# Patient Record
Sex: MALE | Race: WHITE | NOT HISPANIC OR LATINO | Employment: OTHER | URBAN - METROPOLITAN AREA
[De-identification: names, ages, dates, MRNs, and addresses within clinical notes are randomized per-mention and may not be internally consistent; named-entity substitution may affect disease eponyms.]

---

## 2023-03-02 PROBLEM — R53.83 FATIGUE: Status: ACTIVE | Noted: 2023-03-02

## 2023-03-02 PROBLEM — R10.9 ABDOMINAL PAIN: Status: ACTIVE | Noted: 2023-03-02

## 2023-03-02 PROBLEM — R73.9 HYPERGLYCEMIA: Status: ACTIVE | Noted: 2023-03-02

## 2023-03-02 PROBLEM — R35.0 URINARY FREQUENCY: Status: ACTIVE | Noted: 2023-03-02

## 2023-03-02 PROBLEM — R93.3 ABNORMAL CT SCAN, COLON: Status: ACTIVE | Noted: 2023-03-02

## 2023-03-02 PROBLEM — R00.2 PALPITATIONS: Status: ACTIVE | Noted: 2023-03-02

## 2023-03-02 PROBLEM — I87.2 VENOUS INSUFFICIENCY (CHRONIC) (PERIPHERAL): Status: ACTIVE | Noted: 2023-03-02

## 2023-03-02 PROBLEM — I83.90 VARICOSE VEIN OF LEG: Status: ACTIVE | Noted: 2023-03-02

## 2023-03-02 PROBLEM — L30.9 DERMATITIS: Status: ACTIVE | Noted: 2023-03-02

## 2023-03-02 PROBLEM — N28.9 RENAL INSUFFICIENCY: Status: ACTIVE | Noted: 2023-03-02

## 2023-03-02 PROBLEM — E03.9 HYPOTHYROIDISM: Status: ACTIVE | Noted: 2023-03-02

## 2023-03-02 PROBLEM — M54.50 LOW BACK PAIN: Status: ACTIVE | Noted: 2023-03-02

## 2023-03-02 PROBLEM — E78.5 HYPERLIPIDEMIA: Status: ACTIVE | Noted: 2023-03-02

## 2023-03-02 PROBLEM — R19.7 DIARRHEA: Status: ACTIVE | Noted: 2023-03-02

## 2023-03-02 PROBLEM — R05.9 COUGH: Status: ACTIVE | Noted: 2023-03-02

## 2023-03-02 PROBLEM — D64.9 ANEMIA: Status: ACTIVE | Noted: 2023-03-02

## 2023-03-02 PROBLEM — K21.9 ESOPHAGEAL REFLUX: Status: ACTIVE | Noted: 2023-03-02

## 2023-03-02 PROBLEM — E78.01 FAMILIAL HYPERCHOLESTEREMIA: Status: ACTIVE | Noted: 2023-03-02

## 2023-03-02 PROBLEM — L82.1 SEBORRHEIC KERATOSIS: Status: ACTIVE | Noted: 2023-03-02

## 2023-03-02 PROBLEM — E66.3 OVERWEIGHT WITH BODY MASS INDEX (BMI) OF 25 TO 25.9 IN ADULT: Status: ACTIVE | Noted: 2023-03-02

## 2023-03-02 PROBLEM — T16.9XXA FOREIGN BODY IN EAR: Status: ACTIVE | Noted: 2023-03-02

## 2023-03-02 PROBLEM — R42 DIZZINESS: Status: ACTIVE | Noted: 2023-03-02

## 2023-03-02 PROBLEM — E55.9 VITAMIN D DEFICIENCY: Status: ACTIVE | Noted: 2023-03-02

## 2023-03-02 PROBLEM — M54.2 NECK PAIN: Status: ACTIVE | Noted: 2023-03-02

## 2023-03-02 PROBLEM — J34.2 DEVIATED NASAL SEPTUM: Status: ACTIVE | Noted: 2023-03-02

## 2023-03-02 PROBLEM — I10 ESSENTIAL HYPERTENSION: Status: ACTIVE | Noted: 2023-03-02

## 2023-03-02 PROBLEM — R07.9 CHEST PAIN: Status: ACTIVE | Noted: 2023-03-02

## 2023-03-02 PROBLEM — H57.9 EYE DISORDER: Status: ACTIVE | Noted: 2023-03-02

## 2023-03-02 PROBLEM — R97.20 ELEVATED PSA: Status: ACTIVE | Noted: 2023-03-02

## 2023-03-02 PROBLEM — N40.0 BPH (BENIGN PROSTATIC HYPERPLASIA): Status: ACTIVE | Noted: 2023-03-02

## 2023-03-02 PROBLEM — K62.5 RECTAL BLEEDING: Status: ACTIVE | Noted: 2023-03-02

## 2023-03-02 PROBLEM — R10.9 FLANK PAIN: Status: ACTIVE | Noted: 2023-03-02

## 2023-03-02 PROBLEM — R31.0 GROSS HEMATURIA: Status: ACTIVE | Noted: 2023-03-02

## 2023-03-02 RX ORDER — LISINOPRIL 10 MG/1
1 TABLET ORAL DAILY
COMMUNITY
Start: 2020-11-13 | End: 2023-03-09 | Stop reason: SDUPTHER

## 2023-03-02 RX ORDER — SIMVASTATIN 20 MG/1
1 TABLET, FILM COATED ORAL DAILY
COMMUNITY
Start: 2013-10-27 | End: 2023-03-09 | Stop reason: SDUPTHER

## 2023-03-02 RX ORDER — ASPIRIN 81 MG/1
1 TABLET ORAL DAILY
COMMUNITY
Start: 2013-09-25 | End: 2023-11-02 | Stop reason: ALTCHOICE

## 2023-03-02 RX ORDER — LEVOTHYROXINE SODIUM 50 UG/1
1 TABLET ORAL DAILY
COMMUNITY
Start: 2021-05-03 | End: 2023-03-09 | Stop reason: SDUPTHER

## 2023-03-02 RX ORDER — TAMSULOSIN HYDROCHLORIDE 0.4 MG/1
1 CAPSULE ORAL DAILY
COMMUNITY
Start: 2018-04-24 | End: 2023-03-09 | Stop reason: SDUPTHER

## 2023-03-09 ENCOUNTER — OFFICE VISIT (OUTPATIENT)
Dept: PRIMARY CARE | Facility: CLINIC | Age: 83
End: 2023-03-09
Payer: MEDICARE

## 2023-03-09 VITALS
BODY MASS INDEX: 23.62 KG/M2 | HEIGHT: 70 IN | SYSTOLIC BLOOD PRESSURE: 134 MMHG | WEIGHT: 165 LBS | DIASTOLIC BLOOD PRESSURE: 66 MMHG

## 2023-03-09 DIAGNOSIS — R33.9 URINARY RETENTION: ICD-10-CM

## 2023-03-09 DIAGNOSIS — R07.9 CHEST PAIN, UNSPECIFIED TYPE: ICD-10-CM

## 2023-03-09 DIAGNOSIS — I10 HYPERTENSION, UNSPECIFIED TYPE: ICD-10-CM

## 2023-03-09 DIAGNOSIS — E03.9 HYPOTHYROIDISM, UNSPECIFIED TYPE: ICD-10-CM

## 2023-03-09 DIAGNOSIS — M67.431 GANGLION CYST OF DORSUM OF RIGHT WRIST: Primary | ICD-10-CM

## 2023-03-09 DIAGNOSIS — E78.49 OTHER HYPERLIPIDEMIA: ICD-10-CM

## 2023-03-09 DIAGNOSIS — E13.9 DIABETES MELLITUS OF OTHER TYPE WITHOUT COMPLICATION, UNSPECIFIED WHETHER LONG TERM INSULIN USE (MULTI): ICD-10-CM

## 2023-03-09 DIAGNOSIS — I10 PRIMARY HYPERTENSION: ICD-10-CM

## 2023-03-09 PROCEDURE — 1159F MED LIST DOCD IN RCRD: CPT | Performed by: INTERNAL MEDICINE

## 2023-03-09 PROCEDURE — 3078F DIAST BP <80 MM HG: CPT | Performed by: INTERNAL MEDICINE

## 2023-03-09 PROCEDURE — 99214 OFFICE O/P EST MOD 30 MIN: CPT | Performed by: INTERNAL MEDICINE

## 2023-03-09 PROCEDURE — 93000 ELECTROCARDIOGRAM COMPLETE: CPT | Performed by: INTERNAL MEDICINE

## 2023-03-09 PROCEDURE — 3075F SYST BP GE 130 - 139MM HG: CPT | Performed by: INTERNAL MEDICINE

## 2023-03-09 RX ORDER — LEVOTHYROXINE SODIUM 50 UG/1
50 TABLET ORAL DAILY
Qty: 90 TABLET | Refills: 1 | Status: SHIPPED | OUTPATIENT
Start: 2023-03-09 | End: 2023-09-18 | Stop reason: SDUPTHER

## 2023-03-09 RX ORDER — SIMVASTATIN 20 MG/1
20 TABLET, FILM COATED ORAL DAILY
Qty: 90 TABLET | Refills: 1 | Status: SHIPPED | OUTPATIENT
Start: 2023-03-09 | End: 2023-09-18 | Stop reason: SDUPTHER

## 2023-03-09 RX ORDER — LISINOPRIL 10 MG/1
10 TABLET ORAL DAILY
Qty: 90 TABLET | Refills: 1 | Status: SHIPPED | OUTPATIENT
Start: 2023-03-09 | End: 2023-09-18 | Stop reason: SDUPTHER

## 2023-03-09 RX ORDER — TAMSULOSIN HYDROCHLORIDE 0.4 MG/1
0.4 CAPSULE ORAL DAILY
Qty: 90 CAPSULE | Refills: 1 | Status: SHIPPED | OUTPATIENT
Start: 2023-03-09 | End: 2023-09-18 | Stop reason: SDUPTHER
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
## 2023-03-11 ENCOUNTER — LAB (OUTPATIENT)
Dept: LAB | Facility: LAB | Age: 83
End: 2023-03-11
Payer: MEDICARE

## 2023-03-11 DIAGNOSIS — I10 HYPERTENSION, UNSPECIFIED TYPE: ICD-10-CM

## 2023-03-11 DIAGNOSIS — E13.9 DIABETES MELLITUS OF OTHER TYPE WITHOUT COMPLICATION, UNSPECIFIED WHETHER LONG TERM INSULIN USE (MULTI): ICD-10-CM

## 2023-03-11 DIAGNOSIS — E03.9 HYPOTHYROIDISM, UNSPECIFIED TYPE: ICD-10-CM

## 2023-03-11 LAB
ALANINE AMINOTRANSFERASE (SGPT) (U/L) IN SER/PLAS: 14 U/L (ref 10–52)
ALBUMIN (G/DL) IN SER/PLAS: 4.4 G/DL (ref 3.4–5)
ALKALINE PHOSPHATASE (U/L) IN SER/PLAS: 64 U/L (ref 33–136)
ANION GAP IN SER/PLAS: 13 MMOL/L (ref 10–20)
ASPARTATE AMINOTRANSFERASE (SGOT) (U/L) IN SER/PLAS: 16 U/L (ref 9–39)
BILIRUBIN TOTAL (MG/DL) IN SER/PLAS: 1.2 MG/DL (ref 0–1.2)
CALCIUM (MG/DL) IN SER/PLAS: 9.8 MG/DL (ref 8.6–10.6)
CARBON DIOXIDE, TOTAL (MMOL/L) IN SER/PLAS: 29 MMOL/L (ref 21–32)
CHLORIDE (MMOL/L) IN SER/PLAS: 103 MMOL/L (ref 98–107)
CHOLESTEROL (MG/DL) IN SER/PLAS: 160 MG/DL (ref 0–199)
CHOLESTEROL IN HDL (MG/DL) IN SER/PLAS: 42.5 MG/DL
CHOLESTEROL/HDL RATIO: 3.8
CREATININE (MG/DL) IN SER/PLAS: 1.17 MG/DL (ref 0.5–1.3)
ERYTHROCYTE DISTRIBUTION WIDTH (RATIO) BY AUTOMATED COUNT: 13.7 % (ref 11.5–14.5)
ERYTHROCYTE MEAN CORPUSCULAR HEMOGLOBIN CONCENTRATION (G/DL) BY AUTOMATED: 31.1 G/DL (ref 32–36)
ERYTHROCYTE MEAN CORPUSCULAR VOLUME (FL) BY AUTOMATED COUNT: 90 FL (ref 80–100)
ERYTHROCYTES (10*6/UL) IN BLOOD BY AUTOMATED COUNT: 4.72 X10E12/L (ref 4.5–5.9)
GFR MALE: 62 ML/MIN/1.73M2
GLUCOSE (MG/DL) IN SER/PLAS: 101 MG/DL (ref 74–99)
HEMATOCRIT (%) IN BLOOD BY AUTOMATED COUNT: 42.5 % (ref 41–52)
HEMOGLOBIN (G/DL) IN BLOOD: 13.2 G/DL (ref 13.5–17.5)
LDL: 94 MG/DL (ref 0–99)
LEUKOCYTES (10*3/UL) IN BLOOD BY AUTOMATED COUNT: 9.2 X10E9/L (ref 4.4–11.3)
NRBC (PER 100 WBCS) BY AUTOMATED COUNT: 0 /100 WBC (ref 0–0)
PLATELETS (10*3/UL) IN BLOOD AUTOMATED COUNT: 195 X10E9/L (ref 150–450)
POTASSIUM (MMOL/L) IN SER/PLAS: 4.6 MMOL/L (ref 3.5–5.3)
PROTEIN TOTAL: 7.1 G/DL (ref 6.4–8.2)
SODIUM (MMOL/L) IN SER/PLAS: 140 MMOL/L (ref 136–145)
THYROTROPIN (MIU/L) IN SER/PLAS BY DETECTION LIMIT <= 0.05 MIU/L: 2.4 MIU/L (ref 0.44–3.98)
TRIGLYCERIDE (MG/DL) IN SER/PLAS: 117 MG/DL (ref 0–149)
UREA NITROGEN (MG/DL) IN SER/PLAS: 24 MG/DL (ref 6–23)
VLDL: 23 MG/DL (ref 0–40)

## 2023-03-11 PROCEDURE — 84443 ASSAY THYROID STIM HORMONE: CPT

## 2023-03-11 PROCEDURE — 36415 COLL VENOUS BLD VENIPUNCTURE: CPT

## 2023-03-11 PROCEDURE — 80061 LIPID PANEL: CPT

## 2023-03-11 PROCEDURE — 85027 COMPLETE CBC AUTOMATED: CPT

## 2023-03-11 PROCEDURE — 80053 COMPREHEN METABOLIC PANEL: CPT

## 2023-06-20 ENCOUNTER — OFFICE VISIT (OUTPATIENT)
Dept: PRIMARY CARE | Facility: CLINIC | Age: 83
End: 2023-06-20
Payer: MEDICARE

## 2023-06-20 DIAGNOSIS — T14.8XXA HEMATOMA: Primary | ICD-10-CM

## 2023-06-20 PROCEDURE — 1036F TOBACCO NON-USER: CPT | Performed by: INTERNAL MEDICINE

## 2023-06-20 PROCEDURE — 99213 OFFICE O/P EST LOW 20 MIN: CPT | Performed by: INTERNAL MEDICINE

## 2023-06-20 PROCEDURE — 1159F MED LIST DOCD IN RCRD: CPT | Performed by: INTERNAL MEDICINE
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
## 2023-06-20 NOTE — PROGRESS NOTES
Unable to get BP, pt made me feel uncomfortable, was making sexual comments to me regarding his genitals. When pt walked into the exam room he immediately started unbuttoning his pants, I told pt that this was not necessary and the Doctor would assist medical issues. Pt ignored my advised an took his pants down anyways. I advised my  as well regarding this issue

## 2023-07-05 ENCOUNTER — TELEPHONE (OUTPATIENT)
Dept: PRIMARY CARE | Facility: CLINIC | Age: 83
End: 2023-07-05

## 2023-07-05 ENCOUNTER — APPOINTMENT (OUTPATIENT)
Dept: PRIMARY CARE | Facility: CLINIC | Age: 83
End: 2023-07-05
Payer: MEDICARE

## 2023-07-10 ENCOUNTER — OFFICE VISIT (OUTPATIENT)
Dept: PRIMARY CARE | Facility: CLINIC | Age: 83
End: 2023-07-10
Payer: MEDICARE

## 2023-07-10 VITALS
SYSTOLIC BLOOD PRESSURE: 118 MMHG | DIASTOLIC BLOOD PRESSURE: 50 MMHG | BODY MASS INDEX: 23.62 KG/M2 | WEIGHT: 165 LBS | HEIGHT: 70 IN

## 2023-07-10 DIAGNOSIS — S80.12XD HEMATOMA OF LEG, LEFT, SUBSEQUENT ENCOUNTER: Primary | ICD-10-CM

## 2023-07-10 PROCEDURE — 3078F DIAST BP <80 MM HG: CPT | Performed by: INTERNAL MEDICINE

## 2023-07-10 PROCEDURE — 3074F SYST BP LT 130 MM HG: CPT | Performed by: INTERNAL MEDICINE

## 2023-07-10 PROCEDURE — 1036F TOBACCO NON-USER: CPT | Performed by: INTERNAL MEDICINE

## 2023-07-10 PROCEDURE — 1159F MED LIST DOCD IN RCRD: CPT | Performed by: INTERNAL MEDICINE

## 2023-07-10 PROCEDURE — 99213 OFFICE O/P EST LOW 20 MIN: CPT | Performed by: INTERNAL MEDICINE
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
## 2023-09-15 ENCOUNTER — TELEPHONE (OUTPATIENT)
Dept: PRIMARY CARE | Facility: CLINIC | Age: 83
End: 2023-09-15
Payer: MEDICARE

## 2023-09-18 ENCOUNTER — OFFICE VISIT (OUTPATIENT)
Dept: PRIMARY CARE | Facility: CLINIC | Age: 83
End: 2023-09-18
Payer: MEDICARE

## 2023-09-18 VITALS
DIASTOLIC BLOOD PRESSURE: 70 MMHG | SYSTOLIC BLOOD PRESSURE: 136 MMHG | HEIGHT: 70 IN | WEIGHT: 160 LBS | BODY MASS INDEX: 22.9 KG/M2

## 2023-09-18 DIAGNOSIS — R33.9 URINARY RETENTION: ICD-10-CM

## 2023-09-18 DIAGNOSIS — E78.49 OTHER HYPERLIPIDEMIA: ICD-10-CM

## 2023-09-18 DIAGNOSIS — E03.9 HYPOTHYROIDISM, UNSPECIFIED TYPE: ICD-10-CM

## 2023-09-18 DIAGNOSIS — I10 PRIMARY HYPERTENSION: ICD-10-CM

## 2023-09-18 PROCEDURE — 3075F SYST BP GE 130 - 139MM HG: CPT | Performed by: INTERNAL MEDICINE

## 2023-09-18 PROCEDURE — 1036F TOBACCO NON-USER: CPT | Performed by: INTERNAL MEDICINE

## 2023-09-18 PROCEDURE — 99213 OFFICE O/P EST LOW 20 MIN: CPT | Performed by: INTERNAL MEDICINE

## 2023-09-18 PROCEDURE — 3078F DIAST BP <80 MM HG: CPT | Performed by: INTERNAL MEDICINE

## 2023-09-18 PROCEDURE — 1159F MED LIST DOCD IN RCRD: CPT | Performed by: INTERNAL MEDICINE

## 2023-09-18 RX ORDER — LEVOTHYROXINE SODIUM 50 UG/1
50 TABLET ORAL DAILY
Qty: 90 TABLET | Refills: 1 | Status: SHIPPED | OUTPATIENT
Start: 2023-09-18 | End: 2024-06-10

## 2023-09-18 RX ORDER — TAMSULOSIN HYDROCHLORIDE 0.4 MG/1
0.4 CAPSULE ORAL DAILY
Qty: 90 CAPSULE | Refills: 1 | Status: SHIPPED | OUTPATIENT
Start: 2023-09-18 | End: 2024-02-26 | Stop reason: SDUPTHER

## 2023-09-18 RX ORDER — LISINOPRIL 10 MG/1
10 TABLET ORAL DAILY
Qty: 90 TABLET | Refills: 1 | Status: SHIPPED | OUTPATIENT
Start: 2023-09-18 | End: 2024-02-11

## 2023-09-18 RX ORDER — SIMVASTATIN 20 MG/1
20 TABLET, FILM COATED ORAL DAILY
Qty: 90 TABLET | Refills: 1 | Status: SHIPPED | OUTPATIENT
Start: 2023-09-18

## 2023-09-18 ASSESSMENT — ENCOUNTER SYMPTOMS
DEPRESSION: 0
OCCASIONAL FEELINGS OF UNSTEADINESS: 0
LOSS OF SENSATION IN FEET: 0
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
## 2023-09-19 ENCOUNTER — LAB (OUTPATIENT)
Dept: LAB | Facility: LAB | Age: 83
End: 2023-09-19
Payer: MEDICARE

## 2023-09-19 DIAGNOSIS — I10 PRIMARY HYPERTENSION: ICD-10-CM

## 2023-09-19 DIAGNOSIS — R33.9 URINARY RETENTION: ICD-10-CM

## 2023-09-19 DIAGNOSIS — E78.49 OTHER HYPERLIPIDEMIA: ICD-10-CM

## 2023-09-19 DIAGNOSIS — E03.9 HYPOTHYROIDISM, UNSPECIFIED TYPE: ICD-10-CM

## 2023-09-19 LAB
ALANINE AMINOTRANSFERASE (SGPT) (U/L) IN SER/PLAS: 12 U/L (ref 10–52)
ALBUMIN (G/DL) IN SER/PLAS: 4.4 G/DL (ref 3.4–5)
ALKALINE PHOSPHATASE (U/L) IN SER/PLAS: 65 U/L (ref 33–136)
ANION GAP IN SER/PLAS: 15 MMOL/L (ref 10–20)
ASPARTATE AMINOTRANSFERASE (SGOT) (U/L) IN SER/PLAS: 16 U/L (ref 9–39)
BILIRUBIN TOTAL (MG/DL) IN SER/PLAS: 0.7 MG/DL (ref 0–1.2)
CALCIUM (MG/DL) IN SER/PLAS: 9.7 MG/DL (ref 8.6–10.6)
CARBON DIOXIDE, TOTAL (MMOL/L) IN SER/PLAS: 27 MMOL/L (ref 21–32)
CHLORIDE (MMOL/L) IN SER/PLAS: 104 MMOL/L (ref 98–107)
CHOLESTEROL (MG/DL) IN SER/PLAS: 138 MG/DL (ref 0–199)
CHOLESTEROL IN HDL (MG/DL) IN SER/PLAS: 44 MG/DL
CHOLESTEROL/HDL RATIO: 3.1
CREATININE (MG/DL) IN SER/PLAS: 1.31 MG/DL (ref 0.5–1.3)
ERYTHROCYTE DISTRIBUTION WIDTH (RATIO) BY AUTOMATED COUNT: 13.5 % (ref 11.5–14.5)
ERYTHROCYTE MEAN CORPUSCULAR HEMOGLOBIN CONCENTRATION (G/DL) BY AUTOMATED: 33 G/DL (ref 32–36)
ERYTHROCYTE MEAN CORPUSCULAR VOLUME (FL) BY AUTOMATED COUNT: 89 FL (ref 80–100)
ERYTHROCYTES (10*6/UL) IN BLOOD BY AUTOMATED COUNT: 4.46 X10E12/L (ref 4.5–5.9)
GFR MALE: 54 ML/MIN/1.73M2
GLUCOSE (MG/DL) IN SER/PLAS: 94 MG/DL (ref 74–99)
HEMATOCRIT (%) IN BLOOD BY AUTOMATED COUNT: 39.7 % (ref 41–52)
HEMOGLOBIN (G/DL) IN BLOOD: 13.1 G/DL (ref 13.5–17.5)
LDL: 79 MG/DL (ref 0–99)
LEUKOCYTES (10*3/UL) IN BLOOD BY AUTOMATED COUNT: 6.3 X10E9/L (ref 4.4–11.3)
NRBC (PER 100 WBCS) BY AUTOMATED COUNT: 0 /100 WBC (ref 0–0)
PLATELETS (10*3/UL) IN BLOOD AUTOMATED COUNT: 178 X10E9/L (ref 150–450)
POTASSIUM (MMOL/L) IN SER/PLAS: 4.7 MMOL/L (ref 3.5–5.3)
PROTEIN TOTAL: 7.2 G/DL (ref 6.4–8.2)
SODIUM (MMOL/L) IN SER/PLAS: 141 MMOL/L (ref 136–145)
THYROTROPIN (MIU/L) IN SER/PLAS BY DETECTION LIMIT <= 0.05 MIU/L: 3.28 MIU/L (ref 0.44–3.98)
TRIGLYCERIDE (MG/DL) IN SER/PLAS: 77 MG/DL (ref 0–149)
UREA NITROGEN (MG/DL) IN SER/PLAS: 24 MG/DL (ref 6–23)
VLDL: 15 MG/DL (ref 0–40)

## 2023-09-19 PROCEDURE — 80053 COMPREHEN METABOLIC PANEL: CPT

## 2023-09-19 PROCEDURE — 84443 ASSAY THYROID STIM HORMONE: CPT

## 2023-09-19 PROCEDURE — 36415 COLL VENOUS BLD VENIPUNCTURE: CPT

## 2023-09-19 PROCEDURE — 80061 LIPID PANEL: CPT

## 2023-09-19 PROCEDURE — 85027 COMPLETE CBC AUTOMATED: CPT

## 2023-11-02 ENCOUNTER — OFFICE VISIT (OUTPATIENT)
Dept: PRIMARY CARE | Facility: CLINIC | Age: 83
End: 2023-11-02
Payer: MEDICARE

## 2023-11-02 ENCOUNTER — APPOINTMENT (OUTPATIENT)
Dept: LAB | Facility: LAB | Age: 83
End: 2023-11-02
Payer: MEDICARE

## 2023-11-02 VITALS
DIASTOLIC BLOOD PRESSURE: 54 MMHG | SYSTOLIC BLOOD PRESSURE: 124 MMHG | BODY MASS INDEX: 23.25 KG/M2 | WEIGHT: 162.4 LBS | HEIGHT: 70 IN

## 2023-11-02 DIAGNOSIS — K14.6 TONGUE PAIN: Primary | ICD-10-CM

## 2023-11-02 DIAGNOSIS — I10 HYPERTENSION, UNSPECIFIED TYPE: ICD-10-CM

## 2023-11-02 DIAGNOSIS — N18.30 ANEMIA ASSOCIATED WITH STAGE 3 CHRONIC RENAL FAILURE (MULTI): ICD-10-CM

## 2023-11-02 DIAGNOSIS — D63.1 ANEMIA ASSOCIATED WITH STAGE 3 CHRONIC RENAL FAILURE (MULTI): ICD-10-CM

## 2023-11-02 DIAGNOSIS — D64.9 ANEMIA, UNSPECIFIED TYPE: ICD-10-CM

## 2023-11-02 DIAGNOSIS — R51.9 NONINTRACTABLE HEADACHE, UNSPECIFIED CHRONICITY PATTERN, UNSPECIFIED HEADACHE TYPE: ICD-10-CM

## 2023-11-02 PROBLEM — R19.5 OCCULT BLOOD IN STOOLS: Status: ACTIVE | Noted: 2023-11-02

## 2023-11-02 PROBLEM — E11.9 TYPE 2 DIABETES MELLITUS WITHOUT COMPLICATION, WITHOUT LONG-TERM CURRENT USE OF INSULIN (MULTI): Status: ACTIVE | Noted: 2023-11-02

## 2023-11-02 PROBLEM — K92.2 GASTROINTESTINAL HEMORRHAGE: Status: ACTIVE | Noted: 2023-11-02

## 2023-11-02 PROBLEM — T14.8XXA HEMATOMA: Status: ACTIVE | Noted: 2023-11-02

## 2023-11-02 PROBLEM — F41.9 ANXIETY: Status: ACTIVE | Noted: 2023-11-02

## 2023-11-02 PROBLEM — K57.30 DIVERTICULOSIS OF COLON WITHOUT DIVERTICULITIS: Status: ACTIVE | Noted: 2023-11-02

## 2023-11-02 PROBLEM — E87.8 ELECTROLYTE IMBALANCE: Status: ACTIVE | Noted: 2023-11-02

## 2023-11-02 LAB
25(OH)D3 SERPL-MCNC: 29 NG/ML (ref 30–100)
ANION GAP SERPL CALC-SCNC: 16 MMOL/L (ref 10–20)
BUN SERPL-MCNC: 24 MG/DL (ref 6–23)
CALCIUM SERPL-MCNC: 9.7 MG/DL (ref 8.6–10.6)
CHLORIDE SERPL-SCNC: 106 MMOL/L (ref 98–107)
CO2 SERPL-SCNC: 26 MMOL/L (ref 21–32)
CREAT SERPL-MCNC: 1.2 MG/DL (ref 0.5–1.3)
ERYTHROCYTE [DISTWIDTH] IN BLOOD BY AUTOMATED COUNT: 14 % (ref 11.5–14.5)
GFR SERPL CREATININE-BSD FRML MDRD: 60 ML/MIN/1.73M*2
GLUCOSE SERPL-MCNC: 95 MG/DL (ref 74–99)
HCT VFR BLD AUTO: 40.1 % (ref 41–52)
HGB BLD-MCNC: 12.7 G/DL (ref 13.5–17.5)
IRON SATN MFR SERPL: 27 % (ref 25–45)
IRON SERPL-MCNC: 83 UG/DL (ref 35–150)
MCH RBC QN AUTO: 28 PG (ref 26–34)
MCHC RBC AUTO-ENTMCNC: 31.7 G/DL (ref 32–36)
MCV RBC AUTO: 89 FL (ref 80–100)
NRBC BLD-RTO: 0 /100 WBCS (ref 0–0)
PLATELET # BLD AUTO: 180 X10*3/UL (ref 150–450)
POTASSIUM SERPL-SCNC: 4.8 MMOL/L (ref 3.5–5.3)
RBC # BLD AUTO: 4.53 X10*6/UL (ref 4.5–5.9)
SODIUM SERPL-SCNC: 143 MMOL/L (ref 136–145)
TIBC SERPL-MCNC: 312 UG/DL (ref 240–445)
UIBC SERPL-MCNC: 229 UG/DL (ref 110–370)
VIT B12 SERPL-MCNC: 372 PG/ML (ref 211–911)
WBC # BLD AUTO: 7 X10*3/UL (ref 4.4–11.3)

## 2023-11-02 PROCEDURE — 1159F MED LIST DOCD IN RCRD: CPT | Performed by: INTERNAL MEDICINE

## 2023-11-02 PROCEDURE — 80048 BASIC METABOLIC PNL TOTAL CA: CPT

## 2023-11-02 PROCEDURE — 83550 IRON BINDING TEST: CPT

## 2023-11-02 PROCEDURE — 3074F SYST BP LT 130 MM HG: CPT | Performed by: INTERNAL MEDICINE

## 2023-11-02 PROCEDURE — 83540 ASSAY OF IRON: CPT

## 2023-11-02 PROCEDURE — 82306 VITAMIN D 25 HYDROXY: CPT

## 2023-11-02 PROCEDURE — 36415 COLL VENOUS BLD VENIPUNCTURE: CPT

## 2023-11-02 PROCEDURE — 99214 OFFICE O/P EST MOD 30 MIN: CPT | Performed by: INTERNAL MEDICINE

## 2023-11-02 PROCEDURE — 85027 COMPLETE CBC AUTOMATED: CPT

## 2023-11-02 PROCEDURE — 1036F TOBACCO NON-USER: CPT | Performed by: INTERNAL MEDICINE

## 2023-11-02 PROCEDURE — 93000 ELECTROCARDIOGRAM COMPLETE: CPT | Performed by: INTERNAL MEDICINE

## 2023-11-02 PROCEDURE — 3078F DIAST BP <80 MM HG: CPT | Performed by: INTERNAL MEDICINE

## 2023-11-02 PROCEDURE — 82607 VITAMIN B-12: CPT

## 2023-11-02 ASSESSMENT — ENCOUNTER SYMPTOMS
OCCASIONAL FEELINGS OF UNSTEADINESS: 0
DEPRESSION: 0
LOSS OF SENSATION IN FEET: 0
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
## 2023-11-03 ENCOUNTER — APPOINTMENT (OUTPATIENT)
Dept: RADIOLOGY | Facility: CLINIC | Age: 83
End: 2023-11-03
Payer: MEDICARE

## 2023-11-07 ENCOUNTER — ANCILLARY PROCEDURE (OUTPATIENT)
Dept: RADIOLOGY | Facility: CLINIC | Age: 83
End: 2023-11-07
Payer: MEDICARE

## 2023-11-07 DIAGNOSIS — R51.9 NONINTRACTABLE HEADACHE, UNSPECIFIED CHRONICITY PATTERN, UNSPECIFIED HEADACHE TYPE: Primary | ICD-10-CM

## 2023-11-07 PROCEDURE — 70470 CT HEAD/BRAIN W/O & W/DYE: CPT

## 2023-11-07 PROCEDURE — 2550000001 HC RX 255 CONTRASTS: Performed by: INTERNAL MEDICINE

## 2023-11-07 RX ADMIN — IOHEXOL 50 ML: 350 INJECTION, SOLUTION INTRAVENOUS at 15:08

## 2023-11-24 ENCOUNTER — APPOINTMENT (OUTPATIENT)
Dept: RADIOLOGY | Facility: CLINIC | Age: 83
End: 2023-11-24
Payer: MEDICARE

## 2024-02-10 DIAGNOSIS — I10 PRIMARY HYPERTENSION: ICD-10-CM

## 2024-02-11 RX ORDER — LISINOPRIL 10 MG/1
10 TABLET ORAL DAILY
Qty: 90 TABLET | Refills: 0 | Status: SHIPPED | OUTPATIENT
Start: 2024-02-11 | End: 2024-05-12

## 2024-02-26 DIAGNOSIS — R33.9 URINARY RETENTION: ICD-10-CM

## 2024-02-26 RX ORDER — TAMSULOSIN HYDROCHLORIDE 0.4 MG/1
0.4 CAPSULE ORAL DAILY
Qty: 90 CAPSULE | Refills: 1 | Status: SHIPPED
Start: 2024-02-26 | End: 2024-03-13 | Stop reason: SDUPTHER

## 2024-02-26 RX ORDER — TAMSULOSIN HYDROCHLORIDE 0.4 MG/1
0.4 CAPSULE ORAL DAILY
Qty: 90 CAPSULE | Refills: 0 | Status: SHIPPED | OUTPATIENT
Start: 2024-02-26 | End: 2024-05-12

## 2024-03-11 ENCOUNTER — APPOINTMENT (OUTPATIENT)
Dept: PRIMARY CARE | Facility: CLINIC | Age: 84
End: 2024-03-11
Payer: MEDICARE

## 2024-03-12 ENCOUNTER — LAB (OUTPATIENT)
Dept: LAB | Facility: LAB | Age: 84
End: 2024-03-12
Payer: MEDICARE

## 2024-03-12 ENCOUNTER — OFFICE VISIT (OUTPATIENT)
Dept: PRIMARY CARE | Facility: CLINIC | Age: 84
End: 2024-03-12
Payer: MEDICARE

## 2024-03-12 VITALS
SYSTOLIC BLOOD PRESSURE: 134 MMHG | DIASTOLIC BLOOD PRESSURE: 60 MMHG | HEIGHT: 70 IN | BODY MASS INDEX: 23.34 KG/M2 | WEIGHT: 163 LBS

## 2024-03-12 DIAGNOSIS — R07.9 CHEST PAIN, UNSPECIFIED TYPE: ICD-10-CM

## 2024-03-12 DIAGNOSIS — R21 RASH: ICD-10-CM

## 2024-03-12 DIAGNOSIS — E03.9 HYPOTHYROIDISM, UNSPECIFIED TYPE: ICD-10-CM

## 2024-03-12 DIAGNOSIS — N28.9 RENAL INSUFFICIENCY: ICD-10-CM

## 2024-03-12 DIAGNOSIS — E78.2 MIXED HYPERLIPIDEMIA: ICD-10-CM

## 2024-03-12 DIAGNOSIS — I10 ESSENTIAL HYPERTENSION: ICD-10-CM

## 2024-03-12 DIAGNOSIS — E55.9 VITAMIN D DEFICIENCY: ICD-10-CM

## 2024-03-12 LAB
25(OH)D3 SERPL-MCNC: 32 NG/ML (ref 30–100)
ALBUMIN SERPL BCP-MCNC: 4.2 G/DL (ref 3.4–5)
ALP SERPL-CCNC: 67 U/L (ref 33–136)
ALT SERPL W P-5'-P-CCNC: 17 U/L (ref 10–52)
ANION GAP SERPL CALC-SCNC: 12 MMOL/L (ref 10–20)
AST SERPL W P-5'-P-CCNC: 19 U/L (ref 9–39)
BILIRUB SERPL-MCNC: 0.7 MG/DL (ref 0–1.2)
BUN SERPL-MCNC: 21 MG/DL (ref 6–23)
CALCIUM SERPL-MCNC: 9.8 MG/DL (ref 8.6–10.6)
CHLORIDE SERPL-SCNC: 104 MMOL/L (ref 98–107)
CHOLEST SERPL-MCNC: 106 MG/DL (ref 0–199)
CHOLESTEROL/HDL RATIO: 2.7
CO2 SERPL-SCNC: 29 MMOL/L (ref 21–32)
CREAT SERPL-MCNC: 1.09 MG/DL (ref 0.5–1.3)
EGFRCR SERPLBLD CKD-EPI 2021: 67 ML/MIN/1.73M*2
ERYTHROCYTE [DISTWIDTH] IN BLOOD BY AUTOMATED COUNT: 13.8 % (ref 11.5–14.5)
GLUCOSE SERPL-MCNC: 108 MG/DL (ref 74–99)
HCT VFR BLD AUTO: 37.8 % (ref 41–52)
HDLC SERPL-MCNC: 39.5 MG/DL
HGB BLD-MCNC: 12.4 G/DL (ref 13.5–17.5)
LDLC SERPL CALC-MCNC: 51 MG/DL
MCH RBC QN AUTO: 28.8 PG (ref 26–34)
MCHC RBC AUTO-ENTMCNC: 32.8 G/DL (ref 32–36)
MCV RBC AUTO: 88 FL (ref 80–100)
NON HDL CHOLESTEROL: 67 MG/DL (ref 0–149)
NRBC BLD-RTO: 0 /100 WBCS (ref 0–0)
PLATELET # BLD AUTO: 213 X10*3/UL (ref 150–450)
POTASSIUM SERPL-SCNC: 4.4 MMOL/L (ref 3.5–5.3)
PROT SERPL-MCNC: 7 G/DL (ref 6.4–8.2)
RBC # BLD AUTO: 4.3 X10*6/UL (ref 4.5–5.9)
SODIUM SERPL-SCNC: 141 MMOL/L (ref 136–145)
TRIGL SERPL-MCNC: 77 MG/DL (ref 0–149)
VLDL: 15 MG/DL (ref 0–40)
WBC # BLD AUTO: 9.1 X10*3/UL (ref 4.4–11.3)

## 2024-03-12 PROCEDURE — 82306 VITAMIN D 25 HYDROXY: CPT

## 2024-03-12 PROCEDURE — 36415 COLL VENOUS BLD VENIPUNCTURE: CPT

## 2024-03-12 PROCEDURE — 80061 LIPID PANEL: CPT

## 2024-03-12 PROCEDURE — 3075F SYST BP GE 130 - 139MM HG: CPT | Performed by: INTERNAL MEDICINE

## 2024-03-12 PROCEDURE — 80053 COMPREHEN METABOLIC PANEL: CPT

## 2024-03-12 PROCEDURE — 3078F DIAST BP <80 MM HG: CPT | Performed by: INTERNAL MEDICINE

## 2024-03-12 PROCEDURE — 85027 COMPLETE CBC AUTOMATED: CPT

## 2024-03-12 PROCEDURE — 93000 ELECTROCARDIOGRAM COMPLETE: CPT | Performed by: INTERNAL MEDICINE

## 2024-03-12 PROCEDURE — 99214 OFFICE O/P EST MOD 30 MIN: CPT | Performed by: INTERNAL MEDICINE

## 2024-03-12 RX ORDER — TRIAMCINOLONE ACETONIDE 5 MG/G
CREAM TOPICAL 2 TIMES DAILY
Qty: 15 G | Refills: 1 | Status: SHIPPED | OUTPATIENT
Start: 2024-03-12 | End: 2024-04-11

## 2024-03-12 RX ORDER — ASPIRIN 81 MG/1
81 TABLET ORAL DAILY
Qty: 90 TABLET | Refills: 1 | Status: SHIPPED | OUTPATIENT
Start: 2024-03-12 | End: 2024-09-08
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
## 2024-03-20 DIAGNOSIS — N28.9 RENAL INSUFFICIENCY: ICD-10-CM

## 2024-03-21 ENCOUNTER — HOSPITAL ENCOUNTER (OUTPATIENT)
Dept: RADIOLOGY | Facility: CLINIC | Age: 84
Discharge: HOME | End: 2024-03-21
Payer: MEDICARE

## 2024-03-21 DIAGNOSIS — N28.9 RENAL INSUFFICIENCY: ICD-10-CM

## 2024-03-21 DIAGNOSIS — R31.0 GROSS HEMATURIA: ICD-10-CM

## 2024-03-21 PROCEDURE — 76770 US EXAM ABDO BACK WALL COMP: CPT | Performed by: STUDENT IN AN ORGANIZED HEALTH CARE EDUCATION/TRAINING PROGRAM

## 2024-03-21 PROCEDURE — 76770 US EXAM ABDO BACK WALL COMP: CPT

## 2024-04-01 ENCOUNTER — OFFICE VISIT (OUTPATIENT)
Dept: PRIMARY CARE | Facility: CLINIC | Age: 84
End: 2024-04-01
Payer: MEDICARE

## 2024-04-01 VITALS
BODY MASS INDEX: 23.34 KG/M2 | WEIGHT: 163 LBS | HEIGHT: 70 IN | SYSTOLIC BLOOD PRESSURE: 134 MMHG | DIASTOLIC BLOOD PRESSURE: 60 MMHG

## 2024-04-01 DIAGNOSIS — M79.675 PAIN OF TOE OF LEFT FOOT: Primary | ICD-10-CM

## 2024-04-01 DIAGNOSIS — R51.9 ACUTE INTRACTABLE HEADACHE, UNSPECIFIED HEADACHE TYPE: ICD-10-CM

## 2024-04-01 PROCEDURE — 99213 OFFICE O/P EST LOW 20 MIN: CPT | Performed by: INTERNAL MEDICINE

## 2024-04-01 PROCEDURE — 1159F MED LIST DOCD IN RCRD: CPT | Performed by: INTERNAL MEDICINE

## 2024-04-01 PROCEDURE — 3075F SYST BP GE 130 - 139MM HG: CPT | Performed by: INTERNAL MEDICINE

## 2024-04-01 PROCEDURE — 3078F DIAST BP <80 MM HG: CPT | Performed by: INTERNAL MEDICINE
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
## 2024-05-05 ENCOUNTER — APPOINTMENT (OUTPATIENT)
Dept: RADIOLOGY | Facility: HOSPITAL | Age: 84
End: 2024-05-05
Payer: MEDICARE

## 2024-05-05 ENCOUNTER — HOSPITAL ENCOUNTER (EMERGENCY)
Facility: HOSPITAL | Age: 84
Discharge: HOME | End: 2024-05-05
Payer: MEDICARE

## 2024-05-05 VITALS
SYSTOLIC BLOOD PRESSURE: 165 MMHG | RESPIRATION RATE: 18 BRPM | HEIGHT: 70 IN | DIASTOLIC BLOOD PRESSURE: 73 MMHG | HEART RATE: 66 BPM | TEMPERATURE: 97.9 F | BODY MASS INDEX: 23.62 KG/M2 | OXYGEN SATURATION: 100 % | WEIGHT: 165 LBS

## 2024-05-05 DIAGNOSIS — M54.50 ACUTE RIGHT-SIDED LOW BACK PAIN WITHOUT SCIATICA: Primary | ICD-10-CM

## 2024-05-05 PROCEDURE — 72100 X-RAY EXAM L-S SPINE 2/3 VWS: CPT | Performed by: RADIOLOGY

## 2024-05-05 PROCEDURE — 99283 EMERGENCY DEPT VISIT LOW MDM: CPT

## 2024-05-05 PROCEDURE — 2500000005 HC RX 250 GENERAL PHARMACY W/O HCPCS: Performed by: NURSE PRACTITIONER

## 2024-05-05 PROCEDURE — 72100 X-RAY EXAM L-S SPINE 2/3 VWS: CPT

## 2024-05-05 RX ORDER — LIDOCAINE 560 MG/1
1 PATCH PERCUTANEOUS; TOPICAL; TRANSDERMAL ONCE
Status: DISCONTINUED | OUTPATIENT
Start: 2024-05-05 | End: 2024-05-05 | Stop reason: HOSPADM

## 2024-05-05 RX ORDER — LIDOCAINE 50 MG/G
1 PATCH TOPICAL DAILY
Qty: 10 PATCH | Refills: 0 | Status: SHIPPED | OUTPATIENT
Start: 2024-05-05 | End: 2024-05-09 | Stop reason: SDUPTHER

## 2024-05-05 RX ADMIN — LIDOCAINE 1 PATCH: 4 PATCH TOPICAL at 13:07

## 2024-05-05 ASSESSMENT — PAIN DESCRIPTION - LOCATION
LOCATION: BACK
LOCATION: BACK

## 2024-05-05 ASSESSMENT — PAIN DESCRIPTION - FREQUENCY: FREQUENCY: INTERMITTENT

## 2024-05-05 ASSESSMENT — PAIN - FUNCTIONAL ASSESSMENT: PAIN_FUNCTIONAL_ASSESSMENT: 0-10

## 2024-05-05 ASSESSMENT — PAIN DESCRIPTION - ORIENTATION
ORIENTATION: RIGHT
ORIENTATION: RIGHT

## 2024-05-05 ASSESSMENT — PAIN DESCRIPTION - ONSET: ONSET: SUDDEN

## 2024-05-05 ASSESSMENT — PAIN DESCRIPTION - PAIN TYPE: TYPE: ACUTE PAIN

## 2024-05-05 ASSESSMENT — COLUMBIA-SUICIDE SEVERITY RATING SCALE - C-SSRS
1. IN THE PAST MONTH, HAVE YOU WISHED YOU WERE DEAD OR WISHED YOU COULD GO TO SLEEP AND NOT WAKE UP?: NO
6. HAVE YOU EVER DONE ANYTHING, STARTED TO DO ANYTHING, OR PREPARED TO DO ANYTHING TO END YOUR LIFE?: NO
2. HAVE YOU ACTUALLY HAD ANY THOUGHTS OF KILLING YOURSELF?: NO

## 2024-05-05 ASSESSMENT — PAIN SCALES - PAIN ASSESSMENT IN ADVANCED DEMENTIA (PAINAD)
TOTALSCORE: MEDICATION (SEE MAR)
NEGVOCALIZATION: OCCASIONAL MOAN/GROAN, LOW SPEECH, NEGATIVE/DISAPPROVING QUALITY
FACIALEXPRESSION: SMILING OR INEXPRESSIVE
TOTALSCORE: 2
CONSOLABILITY: NO NEED TO CONSOLE
BREATHING: NORMAL
BODYLANGUAGE: TENSE, DISTRESSED PACING, FIDGETING

## 2024-05-05 ASSESSMENT — PAIN SCALES - GENERAL
PAINLEVEL_OUTOF10: 8
PAINLEVEL_OUTOF10: 7

## 2024-05-05 ASSESSMENT — PAIN DESCRIPTION - DESCRIPTORS: DESCRIPTORS: SHARP

## 2024-05-05 ASSESSMENT — PAIN DESCRIPTION - PROGRESSION: CLINICAL_PROGRESSION: NOT CHANGED

## 2024-05-05 ASSESSMENT — PAIN SCALES - WONG BAKER: WONGBAKER_NUMERICALRESPONSE: HURTS WORST

## 2024-05-05 NOTE — ED PROVIDER NOTES
HPI   Chief Complaint   Patient presents with    Fall     I fell backwards denies hitting head and hurt rt lower back and rt forearm it feels sharp I did this last night at 0100         HPI  See my MDM                  [place] Coma Scale Score: 15                     Patient History   Past Medical History:   Diagnosis Date    Disorder of prostate, unspecified     Prostate disorder    Essential (primary) hypertension 02/26/2013    Benign essential hypertension    Other conditions influencing health status     Colonoscopy (Fiberoptic)    Personal history of other diseases of the circulatory system     History of hypertension    Personal history of other specified conditions 07/28/2014    History of fatigue     Past Surgical History:   Procedure Laterality Date    TRANSURETHRAL RESECTION OF PROSTATE  07/01/2013    Transurethral Resection Of Prostate (TURP)     Family History   Problem Relation Name Age of Onset    Bipolar disorder Son       Social History     Tobacco Use    Smoking status: Never    Smokeless tobacco: Never   Substance Use Topics    Alcohol use: Never    Drug use: Never       Physical Exam   ED Triage Vitals [05/05/24 1237]   Temperature Heart Rate Respirations BP   36.6 °C (97.9 °F) 72 18 171/61      Pulse Ox Temp Source Heart Rate Source Patient Position   100 % Temporal Monitor Sitting      BP Location FiO2 (%)     Left arm --       Physical Exam  CONSTITUTIONAL: Vital signs reviewed as charted, well-developed and in no distress  Eyes: Extraocular muscles are intact. Pupils equal round and reactive to light. Conjunctiva are pink.    ENT: Mucous membranes are moist. Tongue in the midline. Pharynx was without erythema or exudates, uvula midline  LUNGS: Breath sounds equal and clear to auscultation. Good air exchange, no wheezes rales or retractions, pulse oximetry is charted.  HEART: Regular rate and rhythm without murmur thrill or rub, strong tones, auscultation is normal.  ABDOMEN: Soft and nontender  without guarding rebound rigidity or mass. Bowel sounds are present and normal in all quadrants. There is no palpable masses or aneurysms identified. No hepatosplenomegaly, normal abdominal exam.  Neuro: The patient is awake, alert and oriented ×3. Moving all 4 extremities and answering questions appropriately.   MUSCULOSKELETAL: There is no midline tenderness deformities or crepitus on palpation cervical thoracic or lumbar spine.  There is tenderness palpation in the right lower lumbar spine paraspinals with small amount of edema and ecchymosis present.  Exam of the right arm does show an abrasion with hematoma underneath.  Upper extremity motor strength is 5 equal bilaterally.  Sensation pulses are intact.  Deep tendon flexes present bilaterally.  No saddle esthesia.  PSYCH: Awake alert oriented, normal mood and affect.  Skin:  Dry, normal color, warm to the touch, no rash present.      ED Course & MDM   Diagnoses as of 05/05/24 1557   Acute right-sided low back pain without sciatica       Medical Decision Making  History obtained from: patient    Vital signs, nursing notes, current medications, past medical history, Surgical history, allergies, social history, family History were reviewed.         HPI:  Patient 83-year-old gentleman presenting emergency room today for evaluation of right-sided low back pain status post fall at 1 AM this morning.  States the fall was mechanical.  He denies dizziness, chest pain, shortness of breath, abdominal pain extremity edema.  Denies bowel or bladder dysfunction.  Vital signs positive for hypertension but otherwise unremarkable.      10 point ROS was reviewed and negative except Noted above in HPI.  DDX: as listed above          MDM Summary/considerations:  EMERGENCY DEPARTMENT COURSE and DIFFERENTIAL DIAGNOSIS/MDM:        The patient presented with a chief complaint of fall, right-sided low back pain. The differential diagnosis associated with this patient's presentation  "includes contusion, fracture, sprain or strain.       Vitals:    Vitals:    05/05/24 1237   BP: 171/61   BP Location: Left arm   Patient Position: Sitting   Pulse: 72   Resp: 18   Temp: 36.6 °C (97.9 °F)   TempSrc: Temporal   SpO2: 100%   Weight: 74.8 kg (165 lb)   Height: 1.778 m (5' 10\")       Diagnoses as of 05/05/24 1557   Acute right-sided low back pain without sciatica       History Limited by:    None    Independent history obtained from:    None      External records reviewed:    None      Diagnostics interpreted by me:    Xray(s) of the lumbar spine      Discussions with other clinicians:    None      Chronic conditions impacting care:    None      Social determinants of health affecting care:    None    Diagnostic tests considered but not performed: none    ED Medications managed:    Medications   lidocaine 4 % patch 1 patch (1 patch transdermal Medication Applied 5/5/24 1307)         Prescription drugs considered:     Lidoderm patches    Labs Reviewed - No data to display  XR lumbar spine 2-3 views   Final Result   No definite acute fracture or subluxation in the lumbar spine.        Diffuse osteopenia.        Moderate multilevel lumbar spondylosis.        No endplate erosion        MACRO:   None        Signed by: Romaine Hickman 5/5/2024 3:48 PM   Dictation workstation:   [id number]        Medications   lidocaine 4 % patch 1 patch (1 patch transdermal Medication Applied 5/5/24 1307)     New Prescriptions    LIDOCAINE (LIDODERM) 5 % PATCH    Place 1 patch over 12 hours on the skin once daily. Remove & discard patch within 12 hours or as directed by MD.     I estimate there is a low risk for abdominal aortic aneurysm, cauda equina syndrome, epidural mass lesion, spinal stenosis, herniated disc causing severe stenosis, thus I considered the discharge disposition reasonable. I have discussed the diagnosis and risks with the patient and we agreed with discharging home to follow up with her primary care doctor. " I also discussed returning to the emergency department immediately if new or worsening symptoms occur. I have discussed the symptoms which are most concerning such as saddle anesthesia, urinary or bowel incontinence or retention, and changing or worsening pain that would necessitate immediate return.      Patient's x-ray negative for acute fracture.  Discussed symptomatic treatment including acetaminophen, Lidoderm patches.  Was discharged home in stable condition will follow with PCP 1 to 2 days for reevaluation.  Discharged home in stable condition.  Able to ambulate without difficulty.    All of the patient's questions were answered to the best of my ability.  Patient states understanding that they have been screened for an emergency today and we have not found any etiology of symptoms that requires emergent treatment or admission to the hospital at this point. They understand that they have not had definitive care day and require follow-up for treatment of their condition. They also state understanding that they may have an emergent condition that may potentially have not of detected at this visit and they must return to the emergency department if they develop any worsening of symptoms or new complaints.      I have evaluated this patient, my supervising physician was available for consultation.            Critical Care: Not warranted at this time        This chart was completed using voice recognition transcription software. Please excuse any errors of transcription including grammatical, punctuation, syntax and spelling errors.  Please contact me with any questions regarding this chart.    Procedure  Procedures     NATHAN Lee  05/05/24 1556       NATHAN Lee  05/05/24 1550

## 2024-05-09 ENCOUNTER — OFFICE VISIT (OUTPATIENT)
Dept: PRIMARY CARE | Facility: CLINIC | Age: 84
End: 2024-05-09
Payer: MEDICARE

## 2024-05-09 VITALS
HEART RATE: 65 BPM | DIASTOLIC BLOOD PRESSURE: 70 MMHG | BODY MASS INDEX: 22.96 KG/M2 | WEIGHT: 160 LBS | OXYGEN SATURATION: 98 % | SYSTOLIC BLOOD PRESSURE: 150 MMHG

## 2024-05-09 DIAGNOSIS — M54.50 ACUTE RIGHT-SIDED LOW BACK PAIN WITHOUT SCIATICA: ICD-10-CM

## 2024-05-09 PROCEDURE — 3077F SYST BP >= 140 MM HG: CPT | Performed by: INTERNAL MEDICINE

## 2024-05-09 PROCEDURE — 3078F DIAST BP <80 MM HG: CPT | Performed by: INTERNAL MEDICINE

## 2024-05-09 PROCEDURE — 99213 OFFICE O/P EST LOW 20 MIN: CPT | Performed by: INTERNAL MEDICINE

## 2024-05-09 PROCEDURE — 1159F MED LIST DOCD IN RCRD: CPT | Performed by: INTERNAL MEDICINE

## 2024-05-09 PROCEDURE — 1125F AMNT PAIN NOTED PAIN PRSNT: CPT | Performed by: INTERNAL MEDICINE

## 2024-05-09 RX ORDER — LIDOCAINE 50 MG/G
1 PATCH TOPICAL DAILY
Qty: 15 PATCH | Refills: 1 | Status: SHIPPED | OUTPATIENT
Start: 2024-05-09

## 2024-05-09 ASSESSMENT — PATIENT HEALTH QUESTIONNAIRE - PHQ9
SUM OF ALL RESPONSES TO PHQ9 QUESTIONS 1 AND 2: 0
1. LITTLE INTEREST OR PLEASURE IN DOING THINGS: NOT AT ALL
2. FEELING DOWN, DEPRESSED OR HOPELESS: NOT AT ALL

## 2024-05-09 ASSESSMENT — ENCOUNTER SYMPTOMS
LOSS OF SENSATION IN FEET: 0
DEPRESSION: 0
OCCASIONAL FEELINGS OF UNSTEADINESS: 1

## 2024-05-09 ASSESSMENT — COLUMBIA-SUICIDE SEVERITY RATING SCALE - C-SSRS
6. HAVE YOU EVER DONE ANYTHING, STARTED TO DO ANYTHING, OR PREPARED TO DO ANYTHING TO END YOUR LIFE?: NO
1. IN THE PAST MONTH, HAVE YOU WISHED YOU WERE DEAD OR WISHED YOU COULD GO TO SLEEP AND NOT WAKE UP?: NO
2. HAVE YOU ACTUALLY HAD ANY THOUGHTS OF KILLING YOURSELF?: NO

## 2024-05-09 ASSESSMENT — PAIN SCALES - GENERAL: PAINLEVEL: 8

## 2024-05-09 NOTE — PROGRESS NOTES
Subjective   Patient ID: Brad Mims is a 83 y.o. male who presents for Follow-up (Brusing on lower back and sides, pain at 10. Due to a fall; happened 3 days ago. ).    HPI patient presents to clinic as follow-up visit from [facility] emergency room after having a fall at home.  He hurt his back as a result of fall.  He is rating his pain as 8 out of 10.  His symptoms are associated with bruising of his lower back as a result of fall.  He denied having any loss of consciousness, no radiation of pain to lower extremity, paresthesia, weakness of arms legs and bladder bowel incontinence.  X-ray of the lumbar spine area done in the emergency room revealed diffuse osteopenia, moderate multilevel lumbar spondylosis and was negative for any fractures or subluxation.  He has history of hypertension, dyslipidemia, BPH, cortical infarct and hypothyroidism.    Review of Systems   Constitutional: Negative.    HENT: Negative.     Eyes: Negative.    Respiratory: Negative.     Cardiovascular: Negative.    Gastrointestinal: Negative.    Endocrine: Negative.    Genitourinary: Negative.    Musculoskeletal:  Positive for back pain.   Skin: Negative.    Allergic/Immunologic: Negative.    Neurological: Negative.    Hematological: Negative.    Psychiatric/Behavioral: Negative.         Objective   /70 (BP Location: Right arm)   Pulse 65   Wt 72.6 kg (160 lb)   SpO2 98%   BMI 22.96 kg/m²     Physical Exam  Constitutional:       Appearance: Normal appearance. He is normal weight.   HENT:      Right Ear: Tympanic membrane normal.      Left Ear: Tympanic membrane and ear canal normal.      Nose: Nose normal.   Neck:      Vascular: No carotid bruit.   Cardiovascular:      Rate and Rhythm: Normal rate.   Pulmonary:      Effort: No respiratory distress.      Breath sounds: No stridor. No wheezing.   Abdominal:      Palpations: Abdomen is soft.      Tenderness: There is no abdominal tenderness. There is no guarding or rebound.    Skin:     Coloration: Skin is not jaundiced.      Findings: No bruising.      Comments: Extensive bruising of the hip and lower back.   Neurological:      General: No focal deficit present.      Mental Status: He is alert and oriented to person, place, and time.   Psychiatric:         Mood and Affect: Mood normal.         Assessment/Plan    patient advised to apply ice pack and bruising area 3 times a day for 15 minutes.  He he is advised to continue lidocaine patch and take Tylenol and Advil for pain.   He will continue home medication and will notify the clinic for any worsening of his symptoms.

## 2024-05-14 ASSESSMENT — ENCOUNTER SYMPTOMS
GASTROINTESTINAL NEGATIVE: 1
RESPIRATORY NEGATIVE: 1
BACK PAIN: 1
ENDOCRINE NEGATIVE: 1
PSYCHIATRIC NEGATIVE: 1
EYES NEGATIVE: 1
CARDIOVASCULAR NEGATIVE: 1
CONSTITUTIONAL NEGATIVE: 1
ALLERGIC/IMMUNOLOGIC NEGATIVE: 1
NEUROLOGICAL NEGATIVE: 1
HEMATOLOGIC/LYMPHATIC NEGATIVE: 1

## 2024-06-10 DIAGNOSIS — E03.9 HYPOTHYROIDISM, UNSPECIFIED TYPE: ICD-10-CM

## 2024-06-10 RX ORDER — LEVOTHYROXINE SODIUM 50 UG/1
50 TABLET ORAL DAILY
Qty: 90 TABLET | Refills: 1 | Status: SHIPPED | OUTPATIENT
Start: 2024-06-10

## 2024-06-12 DIAGNOSIS — M54.50 ACUTE RIGHT-SIDED LOW BACK PAIN WITHOUT SCIATICA: ICD-10-CM

## 2024-06-12 RX ORDER — LIDOCAINE 50 MG/G
PATCH TOPICAL
Qty: 15 PATCH | Refills: 1 | Status: SHIPPED | OUTPATIENT
Start: 2024-06-12

## 2024-06-16 DIAGNOSIS — E78.49 OTHER HYPERLIPIDEMIA: ICD-10-CM

## 2024-06-16 RX ORDER — SIMVASTATIN 20 MG/1
20 TABLET, FILM COATED ORAL DAILY
Qty: 90 TABLET | Refills: 1 | Status: SHIPPED | OUTPATIENT
Start: 2024-06-16

## 2024-07-16 ENCOUNTER — APPOINTMENT (OUTPATIENT)
Dept: PRIMARY CARE | Facility: CLINIC | Age: 84
End: 2024-07-16
Payer: MEDICARE

## 2024-07-16 VITALS
DIASTOLIC BLOOD PRESSURE: 70 MMHG | WEIGHT: 158 LBS | SYSTOLIC BLOOD PRESSURE: 124 MMHG | HEIGHT: 70 IN | BODY MASS INDEX: 22.62 KG/M2

## 2024-07-16 DIAGNOSIS — I10 ESSENTIAL HYPERTENSION: ICD-10-CM

## 2024-07-16 DIAGNOSIS — L82.1 SEBORRHEIC KERATOSIS: Primary | ICD-10-CM

## 2024-07-16 DIAGNOSIS — M54.41 ACUTE BILATERAL LOW BACK PAIN WITH BILATERAL SCIATICA: ICD-10-CM

## 2024-07-16 DIAGNOSIS — M54.42 ACUTE BILATERAL LOW BACK PAIN WITH BILATERAL SCIATICA: ICD-10-CM

## 2024-07-16 DIAGNOSIS — E78.2 MIXED HYPERLIPIDEMIA: ICD-10-CM

## 2024-07-16 PROCEDURE — 3074F SYST BP LT 130 MM HG: CPT | Performed by: INTERNAL MEDICINE

## 2024-07-16 PROCEDURE — 3078F DIAST BP <80 MM HG: CPT | Performed by: INTERNAL MEDICINE

## 2024-07-16 PROCEDURE — 99214 OFFICE O/P EST MOD 30 MIN: CPT | Performed by: INTERNAL MEDICINE
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
## 2024-08-21 ENCOUNTER — HOSPITAL ENCOUNTER (EMERGENCY)
Facility: HOSPITAL | Age: 84
Discharge: HOME | End: 2024-08-21
Payer: MEDICARE

## 2024-08-21 ENCOUNTER — APPOINTMENT (OUTPATIENT)
Dept: RADIOLOGY | Facility: HOSPITAL | Age: 84
End: 2024-08-21
Payer: MEDICARE

## 2024-08-21 VITALS
HEIGHT: 68 IN | HEART RATE: 54 BPM | OXYGEN SATURATION: 98 % | SYSTOLIC BLOOD PRESSURE: 169 MMHG | DIASTOLIC BLOOD PRESSURE: 72 MMHG | RESPIRATION RATE: 18 BRPM | TEMPERATURE: 98.1 F | BODY MASS INDEX: 23.49 KG/M2 | WEIGHT: 155 LBS

## 2024-08-21 DIAGNOSIS — S09.90XA HEAD INJURY, INITIAL ENCOUNTER: Primary | ICD-10-CM

## 2024-08-21 PROCEDURE — 99285 EMERGENCY DEPT VISIT HI MDM: CPT | Mod: 25

## 2024-08-21 PROCEDURE — 72125 CT NECK SPINE W/O DYE: CPT

## 2024-08-21 PROCEDURE — 70450 CT HEAD/BRAIN W/O DYE: CPT | Performed by: RADIOLOGY

## 2024-08-21 PROCEDURE — 72125 CT NECK SPINE W/O DYE: CPT | Performed by: RADIOLOGY

## 2024-08-21 PROCEDURE — 70450 CT HEAD/BRAIN W/O DYE: CPT

## 2024-08-21 PROCEDURE — 2500000005 HC RX 250 GENERAL PHARMACY W/O HCPCS: Performed by: NURSE PRACTITIONER

## 2024-08-21 RX ORDER — LIDOCAINE 560 MG/1
1 PATCH PERCUTANEOUS; TOPICAL; TRANSDERMAL ONCE
Status: DISCONTINUED | OUTPATIENT
Start: 2024-08-21 | End: 2024-08-22 | Stop reason: HOSPADM

## 2024-08-21 RX ORDER — ACETAMINOPHEN 325 MG/1
650 TABLET ORAL ONCE
Status: COMPLETED | OUTPATIENT
Start: 2024-08-21 | End: 2024-08-21

## 2024-08-21 ASSESSMENT — PAIN SCALES - GENERAL: PAINLEVEL_OUTOF10: 5 - MODERATE PAIN

## 2024-08-21 ASSESSMENT — COLUMBIA-SUICIDE SEVERITY RATING SCALE - C-SSRS
1. IN THE PAST MONTH, HAVE YOU WISHED YOU WERE DEAD OR WISHED YOU COULD GO TO SLEEP AND NOT WAKE UP?: NO
2. HAVE YOU ACTUALLY HAD ANY THOUGHTS OF KILLING YOURSELF?: NO
6. HAVE YOU EVER DONE ANYTHING, STARTED TO DO ANYTHING, OR PREPARED TO DO ANYTHING TO END YOUR LIFE?: NO

## 2024-08-21 ASSESSMENT — LIFESTYLE VARIABLES
HAVE PEOPLE ANNOYED YOU BY CRITICIZING YOUR DRINKING: NO
TOTAL SCORE: 0
HAVE YOU EVER FELT YOU SHOULD CUT DOWN ON YOUR DRINKING: NO
EVER FELT BAD OR GUILTY ABOUT YOUR DRINKING: NO
EVER HAD A DRINK FIRST THING IN THE MORNING TO STEADY YOUR NERVES TO GET RID OF A HANGOVER: NO

## 2024-08-21 ASSESSMENT — PAIN - FUNCTIONAL ASSESSMENT: PAIN_FUNCTIONAL_ASSESSMENT: 0-10
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
## 2024-09-29 ENCOUNTER — HOSPITAL ENCOUNTER (EMERGENCY)
Facility: HOSPITAL | Age: 84
Discharge: HOME | End: 2024-09-29
Attending: STUDENT IN AN ORGANIZED HEALTH CARE EDUCATION/TRAINING PROGRAM
Payer: MEDICARE

## 2024-09-29 ENCOUNTER — APPOINTMENT (OUTPATIENT)
Dept: RADIOLOGY | Facility: HOSPITAL | Age: 84
End: 2024-09-29
Payer: MEDICARE

## 2024-09-29 ENCOUNTER — APPOINTMENT (OUTPATIENT)
Dept: CARDIOLOGY | Facility: HOSPITAL | Age: 84
End: 2024-09-29
Payer: MEDICARE

## 2024-09-29 VITALS
SYSTOLIC BLOOD PRESSURE: 164 MMHG | HEART RATE: 75 BPM | OXYGEN SATURATION: 97 % | TEMPERATURE: 98.4 F | DIASTOLIC BLOOD PRESSURE: 83 MMHG | WEIGHT: 155 LBS | RESPIRATION RATE: 20 BRPM | BODY MASS INDEX: 23.49 KG/M2 | HEIGHT: 68 IN
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
## 2024-09-29 DIAGNOSIS — R53.1 GENERAL WEAKNESS: ICD-10-CM

## 2024-09-29 LAB
ALBUMIN SERPL BCP-MCNC: 4 G/DL (ref 3.4–5)
ALP SERPL-CCNC: 50 U/L (ref 33–136)
ALT SERPL W P-5'-P-CCNC: 13 U/L (ref 10–52)
ANION GAP SERPL CALCULATED.3IONS-SCNC: 11 MMOL/L (ref 10–20)
APPEARANCE UR: CLEAR
AST SERPL W P-5'-P-CCNC: 15 U/L (ref 9–39)
BASOPHILS # BLD AUTO: 0.06 X10*3/UL (ref 0–0.1)
BASOPHILS NFR BLD AUTO: 0.9 %
BILIRUB SERPL-MCNC: 1.1 MG/DL (ref 0–1.2)
BILIRUB UR STRIP.AUTO-MCNC: NEGATIVE MG/DL
BUN SERPL-MCNC: 29 MG/DL (ref 6–23)
CALCIUM SERPL-MCNC: 9.4 MG/DL (ref 8.6–10.3)
CHLORIDE SERPL-SCNC: 104 MMOL/L (ref 98–107)
CO2 SERPL-SCNC: 26 MMOL/L (ref 21–32)
COLOR UR: NORMAL
CREAT SERPL-MCNC: 1.26 MG/DL (ref 0.5–1.3)
EGFRCR SERPLBLD CKD-EPI 2021: 56 ML/MIN/1.73M*2
EOSINOPHIL # BLD AUTO: 0.09 X10*3/UL (ref 0–0.4)
EOSINOPHIL NFR BLD AUTO: 1.4 %
ERYTHROCYTE [DISTWIDTH] IN BLOOD BY AUTOMATED COUNT: 14.3 % (ref 11.5–14.5)
GLUCOSE SERPL-MCNC: 126 MG/DL (ref 74–99)
GLUCOSE UR STRIP.AUTO-MCNC: NORMAL MG/DL
HCT VFR BLD AUTO: 38.9 % (ref 41–52)
HGB BLD-MCNC: 12.7 G/DL (ref 13.5–17.5)
HOLD SPECIMEN: NORMAL
IMM GRANULOCYTES # BLD AUTO: 0.02 X10*3/UL (ref 0–0.5)
IMM GRANULOCYTES NFR BLD AUTO: 0.3 % (ref 0–0.9)
KETONES UR STRIP.AUTO-MCNC: NEGATIVE MG/DL
LEUKOCYTE ESTERASE UR QL STRIP.AUTO: NEGATIVE
LYMPHOCYTES # BLD AUTO: 1.23 X10*3/UL (ref 0.8–3)
LYMPHOCYTES NFR BLD AUTO: 19.1 %
MAGNESIUM SERPL-MCNC: 2.09 MG/DL (ref 1.6–2.4)
MCH RBC QN AUTO: 28.8 PG (ref 26–34)
MCHC RBC AUTO-ENTMCNC: 32.6 G/DL (ref 32–36)
MCV RBC AUTO: 88 FL (ref 80–100)
MONOCYTES # BLD AUTO: 0.53 X10*3/UL (ref 0.05–0.8)
MONOCYTES NFR BLD AUTO: 8.2 %
NEUTROPHILS # BLD AUTO: 4.51 X10*3/UL (ref 1.6–5.5)
NEUTROPHILS NFR BLD AUTO: 70.1 %
NITRITE UR QL STRIP.AUTO: NEGATIVE
NRBC BLD-RTO: 0 /100 WBCS (ref 0–0)
PH UR STRIP.AUTO: 5 [PH]
PLATELET # BLD AUTO: 176 X10*3/UL (ref 150–450)
POTASSIUM SERPL-SCNC: 4.8 MMOL/L (ref 3.5–5.3)
PROT SERPL-MCNC: 6.9 G/DL (ref 6.4–8.2)
PROT UR STRIP.AUTO-MCNC: NEGATIVE MG/DL
RBC # BLD AUTO: 4.41 X10*6/UL (ref 4.5–5.9)
RBC # UR STRIP.AUTO: NEGATIVE /UL
SARS-COV-2 RNA RESP QL NAA+PROBE: NOT DETECTED
SODIUM SERPL-SCNC: 136 MMOL/L (ref 136–145)
SP GR UR STRIP.AUTO: 1.01
UROBILINOGEN UR STRIP.AUTO-MCNC: NORMAL MG/DL
WBC # BLD AUTO: 6.4 X10*3/UL (ref 4.4–11.3)

## 2024-09-29 PROCEDURE — 85025 COMPLETE CBC W/AUTO DIFF WBC: CPT | Performed by: STUDENT IN AN ORGANIZED HEALTH CARE EDUCATION/TRAINING PROGRAM

## 2024-09-29 PROCEDURE — 93005 ELECTROCARDIOGRAM TRACING: CPT

## 2024-09-29 PROCEDURE — 36415 COLL VENOUS BLD VENIPUNCTURE: CPT | Performed by: STUDENT IN AN ORGANIZED HEALTH CARE EDUCATION/TRAINING PROGRAM

## 2024-09-29 PROCEDURE — 96360 HYDRATION IV INFUSION INIT: CPT

## 2024-09-29 PROCEDURE — 70450 CT HEAD/BRAIN W/O DYE: CPT

## 2024-09-29 PROCEDURE — 87635 SARS-COV-2 COVID-19 AMP PRB: CPT | Performed by: STUDENT IN AN ORGANIZED HEALTH CARE EDUCATION/TRAINING PROGRAM

## 2024-09-29 PROCEDURE — 72125 CT NECK SPINE W/O DYE: CPT

## 2024-09-29 PROCEDURE — 72125 CT NECK SPINE W/O DYE: CPT | Performed by: RADIOLOGY

## 2024-09-29 PROCEDURE — 70450 CT HEAD/BRAIN W/O DYE: CPT | Performed by: RADIOLOGY

## 2024-09-29 PROCEDURE — 96361 HYDRATE IV INFUSION ADD-ON: CPT

## 2024-09-29 PROCEDURE — 83735 ASSAY OF MAGNESIUM: CPT | Performed by: STUDENT IN AN ORGANIZED HEALTH CARE EDUCATION/TRAINING PROGRAM

## 2024-09-29 PROCEDURE — 2500000004 HC RX 250 GENERAL PHARMACY W/ HCPCS (ALT 636 FOR OP/ED): Performed by: STUDENT IN AN ORGANIZED HEALTH CARE EDUCATION/TRAINING PROGRAM

## 2024-09-29 PROCEDURE — 81003 URINALYSIS AUTO W/O SCOPE: CPT | Performed by: STUDENT IN AN ORGANIZED HEALTH CARE EDUCATION/TRAINING PROGRAM

## 2024-09-29 PROCEDURE — 99285 EMERGENCY DEPT VISIT HI MDM: CPT

## 2024-09-29 PROCEDURE — 2500000001 HC RX 250 WO HCPCS SELF ADMINISTERED DRUGS (ALT 637 FOR MEDICARE OP): Performed by: STUDENT IN AN ORGANIZED HEALTH CARE EDUCATION/TRAINING PROGRAM

## 2024-09-29 PROCEDURE — 80053 COMPREHEN METABOLIC PANEL: CPT | Performed by: STUDENT IN AN ORGANIZED HEALTH CARE EDUCATION/TRAINING PROGRAM

## 2024-09-29 RX ORDER — ACETAMINOPHEN 325 MG/1
975 TABLET ORAL ONCE
Status: COMPLETED | OUTPATIENT
Start: 2024-09-29 | End: 2024-09-29

## 2024-09-29 ASSESSMENT — PAIN SCALES - GENERAL
PAINLEVEL_OUTOF10: 10 - WORST POSSIBLE PAIN
PAINLEVEL_OUTOF10: 10 - WORST POSSIBLE PAIN

## 2024-09-29 ASSESSMENT — COLUMBIA-SUICIDE SEVERITY RATING SCALE - C-SSRS
6. HAVE YOU EVER DONE ANYTHING, STARTED TO DO ANYTHING, OR PREPARED TO DO ANYTHING TO END YOUR LIFE?: NO
2. HAVE YOU ACTUALLY HAD ANY THOUGHTS OF KILLING YOURSELF?: NO
1. IN THE PAST MONTH, HAVE YOU WISHED YOU WERE DEAD OR WISHED YOU COULD GO TO SLEEP AND NOT WAKE UP?: NO

## 2024-09-29 ASSESSMENT — PAIN DESCRIPTION - PAIN TYPE: TYPE: ACUTE PAIN

## 2024-09-29 ASSESSMENT — LIFESTYLE VARIABLES
EVER HAD A DRINK FIRST THING IN THE MORNING TO STEADY YOUR NERVES TO GET RID OF A HANGOVER: NO
TOTAL SCORE: 0
HAVE YOU EVER FELT YOU SHOULD CUT DOWN ON YOUR DRINKING: NO
HAVE PEOPLE ANNOYED YOU BY CRITICIZING YOUR DRINKING: NO
EVER FELT BAD OR GUILTY ABOUT YOUR DRINKING: NO

## 2024-09-29 ASSESSMENT — PAIN DESCRIPTION - PROGRESSION: CLINICAL_PROGRESSION: NOT CHANGED

## 2024-09-29 ASSESSMENT — PAIN - FUNCTIONAL ASSESSMENT: PAIN_FUNCTIONAL_ASSESSMENT: 0-10

## 2024-09-29 ASSESSMENT — PAIN DESCRIPTION - DESCRIPTORS: DESCRIPTORS: ACHING

## 2024-09-29 ASSESSMENT — PAIN DESCRIPTION - LOCATION: LOCATION: BACK

## 2024-09-29 ASSESSMENT — PAIN DESCRIPTION - ONSET: ONSET: AWAKENED FROM SLEEP

## 2024-09-29 NOTE — ED PROVIDER NOTES
HPI   Chief Complaint   Patient presents with    Fall     Fall and flu like sx       This is an 84-year-old male with a past medical history of hypertension, hyperlipidemia, BPH, hypothyroidism presenting to the ED for evaluation of multiple complaints.  He has had 2 falls today due to general weakness, he denies any injury to the upper or lower extremities but states that he has some right sided head and neck pain.  He states that he was feeling very weak and simply fell over, he denies any chest pain or shortness of breath, lightheadedness or dizziness associated with his symptoms.      History provided by:  Patient   used: No            Patient History   Past Medical History:   Diagnosis Date    Disorder of prostate, unspecified     Prostate disorder    Essential (primary) hypertension 02/26/2013    Benign essential hypertension    Other conditions influencing health status     Colonoscopy (Fiberoptic)    Personal history of other diseases of the circulatory system     History of hypertension    Personal history of other specified conditions 07/28/2014    History of fatigue     Past Surgical History:   Procedure Laterality Date    TRANSURETHRAL RESECTION OF PROSTATE  07/01/2013    Transurethral Resection Of Prostate (TURP)     Family History   Problem Relation Name Age of Onset    Bipolar disorder Son       Social History     Tobacco Use    Smoking status: Never    Smokeless tobacco: Never   Substance Use Topics    Alcohol use: Never    Drug use: Never       Physical Exam   ED Triage Vitals   Temp Pulse Resp BP   -- -- -- --      SpO2 Temp src Heart Rate Source Patient Position   -- -- -- --      BP Location FiO2 (%)     -- --       Physical Exam  General: well developed, thin elderly male who is awake and alert, in no apparent distress  Eyes: sclera clear bilaterally, PERRL, EOMI  HENT: normocephalic, atraumatic. Pharynx without erythema or exudates, uvula midline.  No apparent oral or dental  injury.  CV: regular rate and rhythm, no murmur, no gallops, or rubs.   Resp: clear to ascultation bilaterally, no wheezes, rales, or rhonchi  GI: abdomen soft, nontender without rigidity or guarding, no peritoneal signs, abdomen is nondistended, no masses palpated  MSK: No midline spinal tenderness, strength +5/5 to upper and lower extremities bilaterally, no swelling of the extremities.  Neuro: no focal deficits, CN2-12 intact. Sensation fully intact.  NIHSS 0  Psych: appropriate mood and affect, cooperative with exam  Skin: warm, dry, without evidence of rash or abrasions    ED Course & MDM   ED Course as of 09/29/24 1123   Sun Sep 29, 2024   0901 EKG on my interpretation shows normal sinus rhythm with right bundle branch block.  Rate of 60 beats minute.  Normal axis.  QTc is 446 ms, VT interval 190.  No ST elevation or depression, no acute ischemic pattern.  No STEMI.  Normal EKG. [NT]   1021 Head CT shows no acute intracranial pathology. [NT]   1021 CT head wo IV contrast [NT]      ED Course User Index  [NT] Elier De La Cruz DO         Diagnoses as of 09/29/24 1123   Fall, initial encounter   General weakness                 No data recorded                                 Medical Decision Making  He is in no acute distress here, he is here for general weakness and multiple falls reported today.  It seems the falls were due to general weakness, he denies any chest pain or shortness of breath or symptoms suggestive of acute coronary syndrome.  He has no lightheadedness or dizziness but he does have a right sided headache and neck pain.  He states that he was concerned about stroke, a detailed neurologic exam was performed and his NIHSS is 0.  He has no focal deficits concerning for stroke.  CT of the head and neck were ordered due to his symptoms after his fall to rule out hemorrhage or acute injury.  General medical workup also ordered to assess his general weakness.    The patient's medical workup is  completely unremarkable.  He has no leukocytosis to suggest significant infection, no evidence of head or neck injury, he feels much better after Tylenol, his headache has resolved.  Creatinine is near baseline, he was given some IV fluids to treat dehydration.  Otherwise electrolyte levels are all unremarkable, he is negative for COVID and has no evidence of UTI here.  He has no chest pain or shortness of breath, EKG was normal, there were no ischemic changes or signs of arrhythmia.  I am not concerned for ACS.     I reassessed the patient, he states that he feels well and has no complaints.  He was able to get up and ambulate to and from the bathroom in the ED with no assistance.  He has no difficulty walking.  Normal gait.  He was discharged home in stable condition.  He is to follow-up with his PCP regarding any persistent concerns.    Labs Reviewed   CBC WITH AUTO DIFFERENTIAL - Abnormal       Result Value    WBC 6.4      nRBC 0.0      RBC 4.41 (*)     Hemoglobin 12.7 (*)     Hematocrit 38.9 (*)     MCV 88      MCH 28.8      MCHC 32.6      RDW 14.3      Platelets 176      Neutrophils % 70.1      Immature Granulocytes %, Automated 0.3      Lymphocytes % 19.1      Monocytes % 8.2      Eosinophils % 1.4      Basophils % 0.9      Neutrophils Absolute 4.51      Immature Granulocytes Absolute, Automated 0.02      Lymphocytes Absolute 1.23      Monocytes Absolute 0.53      Eosinophils Absolute 0.09      Basophils Absolute 0.06     COMPREHENSIVE METABOLIC PANEL - Abnormal    Glucose 126 (*)     Sodium 136      Potassium 4.8      Chloride 104      Bicarbonate 26      Anion Gap 11      Urea Nitrogen 29 (*)     Creatinine 1.26      eGFR 56 (*)     Calcium 9.4      Albumin 4.0      Alkaline Phosphatase 50      Total Protein 6.9      AST 15      Bilirubin, Total 1.1      ALT 13     SARS-COV-2 PCR - Normal    Coronavirus 2019, PCR Not Detected      Narrative:     This assay has received FDA Emergency Use Authorization (EUA)  and is only authorized for the duration of time that circumstances exist to justify the authorization of the emergency use of in vitro diagnostic tests for the detection of SARS-CoV-2 virus and/or diagnosis of COVID-19 infection under section 564(b)(1) of the Act, 21 U.S.C. 360bbb-3(b)(1). This assay is an in vitro diagnostic nucleic acid amplification test for the qualitative detection of SARS-CoV-2 from nasopharyngeal specimens and has been validated for use at [facility]. Negative results do not preclude COVID-19 infections and should not be used as the sole basis for diagnosis, treatment, or other management decisions.     MAGNESIUM - Normal    Magnesium 2.09     URINALYSIS WITH REFLEX CULTURE AND MICROSCOPIC - Normal    Color, Urine Light-Yellow      Appearance, Urine Clear      Specific Gravity, Urine 1.008      pH, Urine 5.0      Protein, Urine NEGATIVE      Glucose, Urine Normal      Blood, Urine NEGATIVE      Ketones, Urine NEGATIVE      Bilirubin, Urine NEGATIVE      Urobilinogen, Urine Normal      Nitrite, Urine NEGATIVE      Leukocyte Esterase, Urine NEGATIVE     URINALYSIS WITH REFLEX CULTURE AND MICROSCOPIC    Narrative:     The following orders were created for panel order Urinalysis with Reflex Culture and Microscopic.  Procedure                               Abnormality         Status                     ---------                               -----------         ------                     Urinalysis with Reflex C...[[id number]]  Normal              Final result               Extra Urine Gray Tube[[id number]]                            In process                   Please view results for these tests on the individual orders.   EXTRA URINE GRAY TUBE     CT head wo IV contrast   Final Result   No evidence of acute cortical infarct or intracranial hemorrhage. If   there is persistent clinical concern for acute cortical infarction,   MRI with diffusion-weighted images is a better  means for further   evaluation as clinically warranted.        MACRO:   None        Signed by: Dory Ren 9/29/2024 10:17 AM   Dictation workstation:   [id number]      CT cervical spine wo IV contrast   Final Result   1. No significant interval changes since prior. No evidence for an   acute fracture or subluxation of the cervical spine.        2. Confluent biapical pleural base scarring similar to previous exam,   worse on the right, incompletely included in field of view.   Underlying nodules can not be excluded.        MACRO:   None        Signed by: Dory Ren 9/29/2024 10:25 AM   Dictation workstation:   [id number]            Procedure  Procedures     Elier De La Cruz,   09/29/24 1125

## 2024-09-29 NOTE — DISCHARGE INSTRUCTIONS
Follow-up with your primary care physician, return to the ED for any new or worsening symptoms including inability to walk, fall with concern for injury, fevers and chills, other signs of infection, worsening confusion all of which may require additional evaluation by physician in the emergency department.

## 2024-10-02 LAB
ATRIAL RATE: 68 BPM
P AXIS: 74 DEGREES
P OFFSET: 182 MS
P ONSET: 124 MS
PR INTERVAL: 190 MS
Q ONSET: 219 MS
QRS COUNT: 11 BEATS
QRS DURATION: 130 MS
QT INTERVAL: 420 MS
QTC CALCULATION(BAZETT): 446 MS
QTC FREDERICIA: 438 MS
R AXIS: 36 DEGREES
T AXIS: 56 DEGREES
T OFFSET: 429 MS
VENTRICULAR RATE: 68 BPM

## 2024-10-07 ENCOUNTER — APPOINTMENT (OUTPATIENT)
Dept: PRIMARY CARE | Facility: CLINIC | Age: 84
End: 2024-10-07
Payer: MEDICARE

## 2024-10-07 VITALS
DIASTOLIC BLOOD PRESSURE: 62 MMHG | SYSTOLIC BLOOD PRESSURE: 140 MMHG | BODY MASS INDEX: 23.34 KG/M2 | WEIGHT: 154 LBS | HEIGHT: 68 IN

## 2024-10-07 DIAGNOSIS — I10 PRIMARY HYPERTENSION: ICD-10-CM

## 2024-10-07 DIAGNOSIS — E78.49 OTHER HYPERLIPIDEMIA: ICD-10-CM

## 2024-10-07 DIAGNOSIS — E03.9 HYPOTHYROIDISM, UNSPECIFIED TYPE: ICD-10-CM

## 2024-10-07 DIAGNOSIS — R33.9 URINARY RETENTION: ICD-10-CM

## 2024-10-07 DIAGNOSIS — Z23 ENCOUNTER FOR IMMUNIZATION: Primary | ICD-10-CM

## 2024-10-07 PROBLEM — Z86.79 HISTORY OF HYPERTENSION: Status: ACTIVE | Noted: 2024-10-07

## 2024-10-07 PROBLEM — N42.9 DISORDER OF PROSTATE: Status: ACTIVE | Noted: 2024-10-07

## 2024-10-07 PROBLEM — Z86.39 HISTORY OF ELEVATED LIPIDS: Status: ACTIVE | Noted: 2024-10-07

## 2024-10-07 PROCEDURE — 3078F DIAST BP <80 MM HG: CPT | Performed by: INTERNAL MEDICINE

## 2024-10-07 PROCEDURE — 90662 IIV NO PRSV INCREASED AG IM: CPT | Performed by: INTERNAL MEDICINE

## 2024-10-07 PROCEDURE — G0008 ADMIN INFLUENZA VIRUS VAC: HCPCS | Performed by: INTERNAL MEDICINE

## 2024-10-07 PROCEDURE — 99214 OFFICE O/P EST MOD 30 MIN: CPT | Performed by: INTERNAL MEDICINE

## 2024-10-07 PROCEDURE — 1159F MED LIST DOCD IN RCRD: CPT | Performed by: INTERNAL MEDICINE

## 2024-10-07 PROCEDURE — 3077F SYST BP >= 140 MM HG: CPT | Performed by: INTERNAL MEDICINE

## 2024-10-07 RX ORDER — LISINOPRIL 10 MG/1
10 TABLET ORAL DAILY
Qty: 90 TABLET | Refills: 1 | Status: SHIPPED | OUTPATIENT
Start: 2024-10-07

## 2024-10-07 RX ORDER — SIMVASTATIN 20 MG/1
20 TABLET, FILM COATED ORAL DAILY
Qty: 90 TABLET | Refills: 1 | Status: SHIPPED | OUTPATIENT
Start: 2024-10-07

## 2024-10-07 RX ORDER — TAMSULOSIN HYDROCHLORIDE 0.4 MG/1
0.4 CAPSULE ORAL DAILY
Qty: 90 CAPSULE | Refills: 1 | Status: SHIPPED | OUTPATIENT
Start: 2024-10-07

## 2024-10-07 RX ORDER — LEVOTHYROXINE SODIUM 50 UG/1
50 TABLET ORAL DAILY
Qty: 90 TABLET | Refills: 1 | Status: SHIPPED | OUTPATIENT
Start: 2024-10-07
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
## 2024-11-05 ENCOUNTER — APPOINTMENT (OUTPATIENT)
Dept: RADIOLOGY | Facility: HOSPITAL | Age: 84
End: 2024-11-05
Payer: MEDICARE

## 2024-11-05 ENCOUNTER — HOSPITAL ENCOUNTER (EMERGENCY)
Facility: HOSPITAL | Age: 84
Discharge: HOME | End: 2024-11-05
Payer: MEDICARE

## 2024-11-05 VITALS
HEIGHT: 70 IN | OXYGEN SATURATION: 99 % | BODY MASS INDEX: 21.47 KG/M2 | TEMPERATURE: 97.7 F | RESPIRATION RATE: 16 BRPM | SYSTOLIC BLOOD PRESSURE: 153 MMHG | WEIGHT: 150 LBS | HEART RATE: 76 BPM | DIASTOLIC BLOOD PRESSURE: 73 MMHG

## 2024-11-05 DIAGNOSIS — M54.50 ACUTE RIGHT-SIDED LOW BACK PAIN WITHOUT SCIATICA: ICD-10-CM

## 2024-11-05 DIAGNOSIS — M54.6 ACUTE MIDLINE THORACIC BACK PAIN: Primary | ICD-10-CM

## 2024-11-05 PROCEDURE — 72072 X-RAY EXAM THORAC SPINE 3VWS: CPT

## 2024-11-05 PROCEDURE — 2500000005 HC RX 250 GENERAL PHARMACY W/O HCPCS: Performed by: NURSE PRACTITIONER

## 2024-11-05 PROCEDURE — 2500000001 HC RX 250 WO HCPCS SELF ADMINISTERED DRUGS (ALT 637 FOR MEDICARE OP): Performed by: NURSE PRACTITIONER

## 2024-11-05 PROCEDURE — 72072 X-RAY EXAM THORAC SPINE 3VWS: CPT | Performed by: RADIOLOGY

## 2024-11-05 PROCEDURE — 72070 X-RAY EXAM THORAC SPINE 2VWS: CPT

## 2024-11-05 PROCEDURE — 99282 EMERGENCY DEPT VISIT SF MDM: CPT

## 2024-11-05 RX ORDER — LIDOCAINE 50 MG/G
1 PATCH TOPICAL DAILY
Qty: 15 PATCH | Refills: 0 | Status: SHIPPED | OUTPATIENT
Start: 2024-11-05

## 2024-11-05 RX ORDER — ACETAMINOPHEN 325 MG/1
650 TABLET ORAL ONCE
Status: COMPLETED | OUTPATIENT
Start: 2024-11-05 | End: 2024-11-05

## 2024-11-05 RX ORDER — LIDOCAINE 560 MG/1
1 PATCH PERCUTANEOUS; TOPICAL; TRANSDERMAL ONCE
Status: DISCONTINUED | OUTPATIENT
Start: 2024-11-05 | End: 2024-11-05 | Stop reason: HOSPADM

## 2024-11-05 ASSESSMENT — PAIN DESCRIPTION - ORIENTATION: ORIENTATION: RIGHT

## 2024-11-05 ASSESSMENT — PAIN SCALES - GENERAL
PAINLEVEL_OUTOF10: 4
PAINLEVEL_OUTOF10: 10 - WORST POSSIBLE PAIN

## 2024-11-05 ASSESSMENT — LIFESTYLE VARIABLES
EVER HAD A DRINK FIRST THING IN THE MORNING TO STEADY YOUR NERVES TO GET RID OF A HANGOVER: NO
EVER FELT BAD OR GUILTY ABOUT YOUR DRINKING: NO
TOTAL SCORE: 0
HAVE PEOPLE ANNOYED YOU BY CRITICIZING YOUR DRINKING: NO
HAVE YOU EVER FELT YOU SHOULD CUT DOWN ON YOUR DRINKING: NO

## 2024-11-05 ASSESSMENT — PAIN DESCRIPTION - LOCATION: LOCATION: BACK

## 2024-11-05 ASSESSMENT — PAIN - FUNCTIONAL ASSESSMENT: PAIN_FUNCTIONAL_ASSESSMENT: 0-10

## 2024-11-05 ASSESSMENT — PAIN DESCRIPTION - PAIN TYPE: TYPE: ACUTE PAIN

## 2024-11-05 ASSESSMENT — PAIN DESCRIPTION - DESCRIPTORS: DESCRIPTORS: ACHING

## 2024-11-05 NOTE — ED PROVIDER NOTES
HPI   Chief Complaint   Patient presents with    Back Pain     Pt presents to Ed via self for complaints of back pain x 1 day. Pt states that he woke up this morning with right sided back pain that radiates into his neck. Denies any fall or injury which caused the pain.        HPI  See my MDM      Patient History   Past Medical History:   Diagnosis Date    Disorder of prostate, unspecified     Prostate disorder    Essential (primary) hypertension 02/26/2013    Benign essential hypertension    Other conditions influencing health status     Colonoscopy (Fiberoptic)    Personal history of other diseases of the circulatory system     History of hypertension    Personal history of other specified conditions 07/28/2014    History of fatigue     Past Surgical History:   Procedure Laterality Date    TRANSURETHRAL RESECTION OF PROSTATE  07/01/2013    Transurethral Resection Of Prostate (TURP)     Family History   Problem Relation Name Age of Onset    Bipolar disorder Son       Social History     Tobacco Use    Smoking status: Never    Smokeless tobacco: Never   Vaping Use    Vaping status: Never Used   Substance Use Topics    Alcohol use: Never    Drug use: Never       Physical Exam   ED Triage Vitals [11/05/24 1118]   Temperature Heart Rate Respirations BP   36.5 °C (97.7 °F) 76 16 153/73      Pulse Ox Temp Source Heart Rate Source Patient Position   99 % Temporal -- --      BP Location FiO2 (%)     -- --       Physical Exam  CONSTITUTIONAL: Vital signs reviewed as charted, well-developed and in no distress  Eyes: Extraocular muscles are intact. Pupils equal round and reactive to light. Conjunctiva are pink.    ENT: Mucous membranes are moist. Tongue in the midline. Pharynx was without erythema or exudates, uvula midline  LUNGS: Breath sounds equal and clear to auscultation. Good air exchange, no wheezes rales or retractions, pulse oximetry is charted.  HEART: Regular rate and rhythm without murmur thrill or rub, strong  tones, auscultation is normal.  ABDOMEN: Soft and nontender without guarding rebound rigidity or mass. Bowel sounds are present and normal in all quadrants. There is no palpable masses or aneurysms identified. No hepatosplenomegaly, normal abdominal exam.  Neuro: The patient is awake, alert and oriented ×3. Moving all 4 extremities and answering questions appropriately.   MUSCULOSKELETAL: Some mild midline tenderness at the mid thoracic spine.  Some paraspinal muscle tenderness on both sides.  Upper and lower extremity muscle exquisite 5 and equal bilaterally.  Sensation and pulse are intact.  Deep tendon flexes present equal bilaterally.  No saddle esthesia  L2 Adduct Thigh (cross legs) 5/5 bilaterally  L3 Extend Knee 5/5 bilaterally  L4 Dorsiflex Ankle (Up) 5/5 bilaterally  L5 Point Great Toe Up 5/5 bilaterally  L2 L3-L4 Knee Reflex normal bilaterally   S1 Flex Knee 5/5 bilaterally  S2 Plantarflex Toes 5/5 bilaterally  PSYCH: Awake alert oriented, normal mood and affect.  Skin:  Dry, normal color, warm to the touch, no rash present.        ED Course & MDM   Diagnoses as of 11/05/24 1346   Acute midline thoracic back pain                 No data recorded     [place] Coma Scale Score: 15 (11/05/24 1122 : Brian Paladino, RN)                           Medical Decision Making  History obtained from: patient    Vital signs, nursing notes, current medications, past medical history, Surgical history, allergies, social history, family History were reviewed.         HPI:  Patient 84-year-old gentleman presenting emergency room today 24-hour history of thoracic back pain.  States he woke up with yesterday morning.  Denies any dyspnea or diaphoresis.  Does have some tenderness over the middle spine.  Tenderness with movements.  Vital signs within normal limits here is nontoxic and well-appearing.  States he had a history of this in the past.      10 point ROS was reviewed and negative except Noted above in HPI.  DDX: as  listed above          MDM Summary/considerations:  Labs Reviewed - No data to display  XR thoracic spine 2 views   Final Result   No evidence for acute osseous abnormality. Mild to mild-moderate   degenerative changes        Signed by: Daniel Louis 11/5/2024 1:39 PM   Dictation workstation:   [id number]        Medications   lidocaine 4 % patch 1 patch (has no administration in time range)   acetaminophen (Tylenol) tablet 650 mg (650 mg oral Given 11/5/24 1157)     Current Discharge Medication List        I estimate there is LOW risk for COMPARTMENT SYNDROME, DEEP VENOUS THROMBOSIS, SEPTIC ARTHRITIS, TENDON OR NEUROVASCULAR INJURY, thus I consider the discharge disposition reasonable. We have discussed the diagnosis and risks, and we agree with discharging home to follow-up with their primary doctor or the referral orthopedist. We also discussed returning to the Emergency Department immediately if new or worsening symptoms occur. We have discussed the symptoms which aremost concerning (e.g., changing or worsening pain, numbness, weakness) that necessitates immediate return.    X-ray interpreted by the radiologist shows no acute bony abnormality.  Patient was discharged home in stable condition will follow with PCP 1 to 2 days for reevaluation.    All of the patient's questions were answered to the best of my ability.  Patient states understanding that they have been screened for an emergency today and we have not found any etiology of symptoms that requires emergent treatment or admission to the hospital at this point. They understand that they have not had definitive care day and require follow-up for treatment of their condition. They also state understanding that they may have an emergent condition that may potentially have not of detected at this visit and they must return to the emergency department if they develop any worsening of symptoms or new complaints.      I have evaluated this patient, my  supervising physician was available for consultation.              Critical Care: Not warranted at this time        This chart was completed using voice recognition transcription software. Please excuse any errors of transcription including grammatical, punctuation, syntax and spelling errors.  Please contact me with any questions regarding this chart.    Procedure  Procedures     Nelson Porter, VANDANA-CNP  11/05/24 1342

## 2024-11-09 ENCOUNTER — HOSPITAL ENCOUNTER (EMERGENCY)
Facility: HOSPITAL | Age: 84
Discharge: HOME | End: 2024-11-09
Attending: EMERGENCY MEDICINE
Payer: MEDICARE

## 2024-11-09 VITALS
WEIGHT: 155 LBS | BODY MASS INDEX: 22.19 KG/M2 | TEMPERATURE: 97.5 F | SYSTOLIC BLOOD PRESSURE: 154 MMHG | HEART RATE: 68 BPM | OXYGEN SATURATION: 100 % | DIASTOLIC BLOOD PRESSURE: 84 MMHG | RESPIRATION RATE: 16 BRPM | HEIGHT: 70 IN

## 2024-11-09 DIAGNOSIS — T65.91XA ACCIDENTAL INGESTION OF SUBSTANCE, INITIAL ENCOUNTER: Primary | ICD-10-CM

## 2024-11-09 PROCEDURE — 99281 EMR DPT VST MAYX REQ PHY/QHP: CPT

## 2024-11-09 ASSESSMENT — COLUMBIA-SUICIDE SEVERITY RATING SCALE - C-SSRS
2. HAVE YOU ACTUALLY HAD ANY THOUGHTS OF KILLING YOURSELF?: NO
1. IN THE PAST MONTH, HAVE YOU WISHED YOU WERE DEAD OR WISHED YOU COULD GO TO SLEEP AND NOT WAKE UP?: NO
6. HAVE YOU EVER DONE ANYTHING, STARTED TO DO ANYTHING, OR PREPARED TO DO ANYTHING TO END YOUR LIFE?: NO
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
## 2024-11-13 ENCOUNTER — OFFICE VISIT (OUTPATIENT)
Dept: PRIMARY CARE | Facility: CLINIC | Age: 84
End: 2024-11-13
Payer: MEDICARE

## 2024-11-13 VITALS
DIASTOLIC BLOOD PRESSURE: 68 MMHG | SYSTOLIC BLOOD PRESSURE: 132 MMHG | HEIGHT: 70 IN | BODY MASS INDEX: 22.33 KG/M2 | WEIGHT: 156 LBS

## 2024-11-13 DIAGNOSIS — F41.9 ANXIETY: Primary | ICD-10-CM

## 2024-11-13 PROCEDURE — 3075F SYST BP GE 130 - 139MM HG: CPT | Performed by: INTERNAL MEDICINE

## 2024-11-13 PROCEDURE — 99213 OFFICE O/P EST LOW 20 MIN: CPT | Performed by: INTERNAL MEDICINE

## 2024-11-13 PROCEDURE — 1159F MED LIST DOCD IN RCRD: CPT | Performed by: INTERNAL MEDICINE

## 2024-11-13 PROCEDURE — 3078F DIAST BP <80 MM HG: CPT | Performed by: INTERNAL MEDICINE
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
## 2025-01-10 ENCOUNTER — HOSPITAL ENCOUNTER (EMERGENCY)
Facility: HOSPITAL | Age: 85
Discharge: HOME | End: 2025-01-10
Payer: MEDICARE

## 2025-01-10 ENCOUNTER — APPOINTMENT (OUTPATIENT)
Dept: RADIOLOGY | Facility: HOSPITAL | Age: 85
End: 2025-01-10
Payer: MEDICARE

## 2025-01-10 VITALS
DIASTOLIC BLOOD PRESSURE: 89 MMHG | OXYGEN SATURATION: 100 % | HEART RATE: 88 BPM | SYSTOLIC BLOOD PRESSURE: 188 MMHG | TEMPERATURE: 98.4 F | RESPIRATION RATE: 18 BRPM | HEIGHT: 70 IN | BODY MASS INDEX: 22.9 KG/M2 | WEIGHT: 160 LBS

## 2025-01-10 DIAGNOSIS — S00.83XA CONTUSION OF FACE, INITIAL ENCOUNTER: Primary | ICD-10-CM

## 2025-01-10 PROCEDURE — 70450 CT HEAD/BRAIN W/O DYE: CPT | Performed by: RADIOLOGY

## 2025-01-10 PROCEDURE — 70486 CT MAXILLOFACIAL W/O DYE: CPT | Performed by: RADIOLOGY

## 2025-01-10 PROCEDURE — 70486 CT MAXILLOFACIAL W/O DYE: CPT

## 2025-01-10 PROCEDURE — 70450 CT HEAD/BRAIN W/O DYE: CPT

## 2025-01-10 PROCEDURE — 76377 3D RENDER W/INTRP POSTPROCES: CPT

## 2025-01-10 PROCEDURE — 76377 3D RENDER W/INTRP POSTPROCES: CPT | Performed by: RADIOLOGY

## 2025-01-10 PROCEDURE — 99284 EMERGENCY DEPT VISIT MOD MDM: CPT | Mod: 25

## 2025-01-10 ASSESSMENT — PAIN SCALES - GENERAL: PAINLEVEL_OUTOF10: 0 - NO PAIN

## 2025-01-10 ASSESSMENT — PAIN - FUNCTIONAL ASSESSMENT: PAIN_FUNCTIONAL_ASSESSMENT: 0-10
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
## 2025-01-27 DIAGNOSIS — F41.9 ANXIETY: ICD-10-CM

## 2025-03-15 DIAGNOSIS — I10 PRIMARY HYPERTENSION: ICD-10-CM

## 2025-03-15 RX ORDER — LISINOPRIL 10 MG/1
10 TABLET ORAL DAILY
Qty: 90 TABLET | Refills: 0 | Status: SHIPPED | OUTPATIENT
Start: 2025-03-15

## 2025-03-18 ENCOUNTER — HOSPITAL ENCOUNTER (EMERGENCY)
Facility: HOSPITAL | Age: 85
Discharge: HOME | End: 2025-03-18
Payer: MEDICARE

## 2025-03-18 ENCOUNTER — APPOINTMENT (OUTPATIENT)
Dept: RADIOLOGY | Facility: HOSPITAL | Age: 85
End: 2025-03-18
Payer: MEDICARE

## 2025-03-18 VITALS
SYSTOLIC BLOOD PRESSURE: 193 MMHG | WEIGHT: 162 LBS | DIASTOLIC BLOOD PRESSURE: 81 MMHG | RESPIRATION RATE: 18 BRPM | TEMPERATURE: 97.5 F | HEIGHT: 70 IN | HEART RATE: 62 BPM | OXYGEN SATURATION: 100 % | BODY MASS INDEX: 23.19 KG/M2

## 2025-03-18 DIAGNOSIS — K40.90 LEFT INGUINAL HERNIA: ICD-10-CM

## 2025-03-18 DIAGNOSIS — R31.9 HEMATURIA, UNSPECIFIED TYPE: Primary | ICD-10-CM

## 2025-03-18 DIAGNOSIS — I10 HYPERTENSION, UNSPECIFIED TYPE: ICD-10-CM

## 2025-03-18 LAB
ANION GAP SERPL CALCULATED.3IONS-SCNC: 9 MMOL/L (ref 10–20)
APPEARANCE UR: CLEAR
BASOPHILS # BLD AUTO: 0.02 X10*3/UL (ref 0–0.1)
BASOPHILS NFR BLD AUTO: 0.3 %
BILIRUB UR STRIP.AUTO-MCNC: NEGATIVE MG/DL
BUN SERPL-MCNC: 30 MG/DL (ref 6–23)
CALCIUM SERPL-MCNC: 9 MG/DL (ref 8.6–10.3)
CHLORIDE SERPL-SCNC: 105 MMOL/L (ref 98–107)
CO2 SERPL-SCNC: 30 MMOL/L (ref 21–32)
COLOR UR: NORMAL
CREAT SERPL-MCNC: 1.25 MG/DL (ref 0.5–1.3)
EGFRCR SERPLBLD CKD-EPI 2021: 57 ML/MIN/1.73M*2
EOSINOPHIL # BLD AUTO: 0.05 X10*3/UL (ref 0–0.4)
EOSINOPHIL NFR BLD AUTO: 0.7 %
ERYTHROCYTE [DISTWIDTH] IN BLOOD BY AUTOMATED COUNT: 13.8 % (ref 11.5–14.5)
GLUCOSE SERPL-MCNC: 137 MG/DL (ref 74–99)
GLUCOSE UR STRIP.AUTO-MCNC: NORMAL MG/DL
HCT VFR BLD AUTO: 38.8 % (ref 41–52)
HGB BLD-MCNC: 12.6 G/DL (ref 13.5–17.5)
IMM GRANULOCYTES # BLD AUTO: 0.02 X10*3/UL (ref 0–0.5)
IMM GRANULOCYTES NFR BLD AUTO: 0.3 % (ref 0–0.9)
KETONES UR STRIP.AUTO-MCNC: NEGATIVE MG/DL
LEUKOCYTE ESTERASE UR QL STRIP.AUTO: NEGATIVE
LYMPHOCYTES # BLD AUTO: 1.39 X10*3/UL (ref 0.8–3)
LYMPHOCYTES NFR BLD AUTO: 19.1 %
MCH RBC QN AUTO: 29.6 PG (ref 26–34)
MCHC RBC AUTO-ENTMCNC: 32.5 G/DL (ref 32–36)
MCV RBC AUTO: 91 FL (ref 80–100)
MONOCYTES # BLD AUTO: 0.62 X10*3/UL (ref 0.05–0.8)
MONOCYTES NFR BLD AUTO: 8.5 %
NEUTROPHILS # BLD AUTO: 5.18 X10*3/UL (ref 1.6–5.5)
NEUTROPHILS NFR BLD AUTO: 71.1 %
NITRITE UR QL STRIP.AUTO: NEGATIVE
NRBC BLD-RTO: 0 /100 WBCS (ref 0–0)
PH UR STRIP.AUTO: 6.5 [PH]
PLATELET # BLD AUTO: 164 X10*3/UL (ref 150–450)
POTASSIUM SERPL-SCNC: 4.8 MMOL/L (ref 3.5–5.3)
PROT UR STRIP.AUTO-MCNC: NEGATIVE MG/DL
RBC # BLD AUTO: 4.25 X10*6/UL (ref 4.5–5.9)
RBC # UR STRIP.AUTO: NEGATIVE MG/DL
SODIUM SERPL-SCNC: 139 MMOL/L (ref 136–145)
SP GR UR STRIP.AUTO: 1.02
UROBILINOGEN UR STRIP.AUTO-MCNC: NORMAL MG/DL
WBC # BLD AUTO: 7.3 X10*3/UL (ref 4.4–11.3)

## 2025-03-18 PROCEDURE — 74176 CT ABD & PELVIS W/O CONTRAST: CPT

## 2025-03-18 PROCEDURE — 2500000001 HC RX 250 WO HCPCS SELF ADMINISTERED DRUGS (ALT 637 FOR MEDICARE OP)

## 2025-03-18 PROCEDURE — 99284 EMERGENCY DEPT VISIT MOD MDM: CPT | Mod: 25

## 2025-03-18 PROCEDURE — 80048 BASIC METABOLIC PNL TOTAL CA: CPT

## 2025-03-18 PROCEDURE — 85025 COMPLETE CBC W/AUTO DIFF WBC: CPT

## 2025-03-18 PROCEDURE — 81003 URINALYSIS AUTO W/O SCOPE: CPT | Performed by: EMERGENCY MEDICINE

## 2025-03-18 PROCEDURE — 36415 COLL VENOUS BLD VENIPUNCTURE: CPT

## 2025-03-18 PROCEDURE — 74176 CT ABD & PELVIS W/O CONTRAST: CPT | Mod: FOREIGN READ | Performed by: RADIOLOGY

## 2025-03-18 RX ORDER — LISINOPRIL 10 MG/1
10 TABLET ORAL DAILY
Status: DISCONTINUED | OUTPATIENT
Start: 2025-03-18 | End: 2025-03-18 | Stop reason: HOSPADM

## 2025-03-18 RX ADMIN — LISINOPRIL 10 MG: 10 TABLET ORAL at 19:46

## 2025-03-18 ASSESSMENT — PAIN SCALES - GENERAL: PAINLEVEL_OUTOF10: 0 - NO PAIN

## 2025-03-18 ASSESSMENT — LIFESTYLE VARIABLES
HAVE YOU EVER FELT YOU SHOULD CUT DOWN ON YOUR DRINKING: NO
EVER HAD A DRINK FIRST THING IN THE MORNING TO STEADY YOUR NERVES TO GET RID OF A HANGOVER: NO
TOTAL SCORE: 0
EVER FELT BAD OR GUILTY ABOUT YOUR DRINKING: NO
HAVE PEOPLE ANNOYED YOU BY CRITICIZING YOUR DRINKING: NO

## 2025-03-18 ASSESSMENT — PAIN DESCRIPTION - PROGRESSION: CLINICAL_PROGRESSION: NOT CHANGED

## 2025-03-18 ASSESSMENT — PAIN - FUNCTIONAL ASSESSMENT: PAIN_FUNCTIONAL_ASSESSMENT: 0-10

## 2025-03-18 NOTE — DISCHARGE INSTRUCTIONS
Please take your blood pressure medication as prescribed.  Take it daily.  Track your blood pressure daily.  If you have persistent concerns about blood in your urine, follow-up with urology as referred.    Be sure to take all medications, over the counter medications or prescription medications only as directed.    Be sure to follow up as directed in 1-2 days. All of the details of your follow up instructions are detailed in the follow up section of this packet.    If you are being discharged with any pains medications or muscle relaxers (norco, Vicodin, hydrocodone products, Percocet, oxycodone products, flexeril, cyclobenzaprine, robaxin, norflex, brand or generic, or any other pain controlling medications with the exception of Ibuprofen and regular Tylenol, do not drive or operate machinery, climb ladders or participate in any activity that could potentially put yourself or others at risk should you get dizzy, or be/feel impaired at all.    Return to emergency room without delay for ANY new or worsening pains or for any other symptoms or concerns. Return with worsening pains, nausea, vomiting, trouble breathing, palpitations, shortness of breath, inability to pass stool or urine, loss of control of stool or urine, any numbness or tingling (that is not normal for you), uncontrolled fevers, the passing of blood or other material in stool or urine, rashes, pains or for any other symptoms or concerns you may have. You are always welcome to return to the ER at any time for any reason or for any other concerns you may have.

## 2025-03-18 NOTE — ED PROVIDER NOTES
HPI   Chief Complaint   Patient presents with    Blood in Urine     Pt states that he has blood in his urine and thinks there is a problem with his prostate       Patient is an 84-year-old male presenting with concerns for hematuria.  States that he had 1 bout of hematuria last night.  Denies a history of fluid.  No acute issues.  Has never had blood in his urine before.  Does endorse a history of prostate disorders and a large prostate.  Patient also has discussions about his history of hypertension but states he is having no significant problems with his hypertension at this time.  Patient denies fevers, chills, cough, sore throat, runny nose, chest pain, shortness of breath, abdominal pain, nausea, vomiting, diarrhea.              Patient History   Past Medical History:   Diagnosis Date    Disorder of prostate, unspecified     Prostate disorder    Essential (primary) hypertension 02/26/2013    Benign essential hypertension    Other conditions influencing health status     Colonoscopy (Fiberoptic)    Personal history of other diseases of the circulatory system     History of hypertension    Personal history of other specified conditions 07/28/2014    History of fatigue     Past Surgical History:   Procedure Laterality Date    TRANSURETHRAL RESECTION OF PROSTATE  07/01/2013    Transurethral Resection Of Prostate (TURP)     Family History   Problem Relation Name Age of Onset    Bipolar disorder Son       Social History     Tobacco Use    Smoking status: Never    Smokeless tobacco: Never   Vaping Use    Vaping status: Never Used   Substance Use Topics    Alcohol use: Never    Drug use: Never       Physical Exam   ED Triage Vitals [03/18/25 1525]   Temperature Heart Rate Respirations BP   36.4 °C (97.5 °F) 67 18 177/75      Pulse Ox Temp Source Heart Rate Source Patient Position   98 % Temporal -- Sitting      BP Location FiO2 (%)     Left arm --       Physical Exam  Vitals and nursing note reviewed.   Constitutional:        Appearance: He is well-developed.      Comments: Awake, standing in examination room   HENT:      Head: Normocephalic and atraumatic.      Nose: Nose normal.      Mouth/Throat:      Mouth: Mucous membranes are moist.      Pharynx: Oropharynx is clear.   Eyes:      Extraocular Movements: Extraocular movements intact.      Conjunctiva/sclera: Conjunctivae normal.      Pupils: Pupils are equal, round, and reactive to light.   Cardiovascular:      Rate and Rhythm: Normal rate and regular rhythm.      Pulses: Normal pulses.      Heart sounds: Normal heart sounds. No murmur heard.  Pulmonary:      Effort: Pulmonary effort is normal. No respiratory distress.      Breath sounds: Normal breath sounds.   Abdominal:      General: Abdomen is flat.      Palpations: Abdomen is soft.      Tenderness: There is no abdominal tenderness.   Musculoskeletal:         General: No swelling. Normal range of motion.      Cervical back: Normal range of motion and neck supple.   Skin:     General: Skin is warm and dry.      Capillary Refill: Capillary refill takes less than 2 seconds.   Neurological:      General: No focal deficit present.      Mental Status: He is alert and oriented to person, place, and time.   Psychiatric:         Mood and Affect: Mood normal.         Behavior: Behavior normal.           ED Course & MDM   Diagnoses as of 03/19/25 0016   Hematuria, unspecified type   Hypertension, unspecified type   Left inguinal hernia                 No data recorded     [place] Coma Scale Score: 15 (03/18/25 1523 : Saige Tian RN)                           Medical Decision Making  Patient is an 84-year-old male presenting with concerns for hematuria.  Lab work, urine, imaging ordered.  Conditions considered include but are not limited to: UTI, kidney stone.    Attending physician was available for consultation on this patient.  CBC is without leukocytosis but does show signs of anemia with hemoglobin of 12.6.  BMP without  significant electrolyte abnormality or renal impairment.  UA with micro is without infection and without blood.  CT abdomen pelvis without acute findings.  Left-sided inguinal hernia.    I believe this patient is at low risk for complication, and a disposition of discharge is acceptable.  Return to the Emergency Department if new or worsening symptoms including headache, fever, chills, chest pain, shortness of breath, syncope, near syncope, abdominal pain, nausea, vomiting,  diarrhea, or worsening pain.  Referral to urology given.  Patient states that he does not take his blood pressure medication today so patient was given his home dose while in the emergency department.  Encourage patient and wife at bedside to have the patient take blood pressure medication as prescribed.  Patient is agreeable to this and to follow with primary care in the next several days.    Portions of this note made with Dragon software, please be mindful of potential grammatical errors.      Medications - No data to display  Labs Reviewed   CBC WITH AUTO DIFFERENTIAL - Abnormal       Result Value    WBC 7.3      nRBC 0.0      RBC 4.25 (*)     Hemoglobin 12.6 (*)     Hematocrit 38.8 (*)     MCV 91      MCH 29.6      MCHC 32.5      RDW 13.8      Platelets 164      Neutrophils % 71.1      Immature Granulocytes %, Automated 0.3      Lymphocytes % 19.1      Monocytes % 8.5      Eosinophils % 0.7      Basophils % 0.3      Neutrophils Absolute 5.18      Immature Granulocytes Absolute, Automated 0.02      Lymphocytes Absolute 1.39      Monocytes Absolute 0.62      Eosinophils Absolute 0.05      Basophils Absolute 0.02     BASIC METABOLIC PANEL - Abnormal    Glucose 137 (*)     Sodium 139      Potassium 4.8      Chloride 105      Bicarbonate 30      Anion Gap 9 (*)     Urea Nitrogen 30 (*)     Creatinine 1.25      eGFR 57 (*)     Calcium 9.0     URINALYSIS WITH REFLEX CULTURE AND MICROSCOPIC - Normal    Color, Urine Light-Yellow      Appearance,  Urine Clear      Specific Gravity, Urine 1.018      pH, Urine 6.5      Protein, Urine NEGATIVE      Glucose, Urine Normal      Blood, Urine NEGATIVE      Ketones, Urine NEGATIVE      Bilirubin, Urine NEGATIVE      Urobilinogen, Urine Normal      Nitrite, Urine NEGATIVE      Leukocyte Esterase, Urine NEGATIVE     URINALYSIS WITH REFLEX CULTURE AND MICROSCOPIC    Narrative:     The following orders were created for panel order Urinalysis with Reflex Culture and Microscopic.  Procedure                               Abnormality         Status                     ---------                               -----------         ------                     Urinalysis with Reflex C...[[id number]]  Normal              Final result               Extra Urine Gray Tube[[id number]]                            In process                   Please view results for these tests on the individual orders.   EXTRA URINE GRAY TUBE     CT abdomen pelvis wo IV contrast   Final Result   1.  No evidence of hydronephrosis or nephrolithiasis.  No ureteral or   bladder calculi visualized.   2.  Left inguinal hernia, containing a portion of the sigmoid colon.    No evidence of obstruction or strangulation.   3.  Colonic diverticulosis, with no evidence of diverticulitis.   4.  Enlarged prostate gland.   Signed by Phillip Noriega MD            Procedure  Procedures     Duane Gaytan PA-C  03/19/25 0016

## 2025-03-19 LAB — HOLD SPECIMEN: NORMAL

## 2025-04-07 ENCOUNTER — APPOINTMENT (OUTPATIENT)
Dept: PRIMARY CARE | Facility: CLINIC | Age: 85
End: 2025-04-07
Payer: MEDICARE

## 2025-04-07 VITALS
SYSTOLIC BLOOD PRESSURE: 132 MMHG | DIASTOLIC BLOOD PRESSURE: 62 MMHG | HEIGHT: 70 IN | BODY MASS INDEX: 22.48 KG/M2 | WEIGHT: 157 LBS

## 2025-04-07 DIAGNOSIS — E03.9 HYPOTHYROIDISM, UNSPECIFIED TYPE: ICD-10-CM

## 2025-04-07 DIAGNOSIS — N40.0 BENIGN PROSTATIC HYPERPLASIA, UNSPECIFIED WHETHER LOWER URINARY TRACT SYMPTOMS PRESENT: ICD-10-CM

## 2025-04-07 DIAGNOSIS — E78.49 OTHER HYPERLIPIDEMIA: ICD-10-CM

## 2025-04-07 DIAGNOSIS — R33.9 URINARY RETENTION: ICD-10-CM

## 2025-04-07 DIAGNOSIS — D64.9 ANEMIA, UNSPECIFIED TYPE: ICD-10-CM

## 2025-04-07 DIAGNOSIS — Z00.00 ENCOUNTER FOR MEDICARE ANNUAL WELLNESS EXAM: Primary | ICD-10-CM

## 2025-04-07 DIAGNOSIS — E55.9 VITAMIN D DEFICIENCY: ICD-10-CM

## 2025-04-07 DIAGNOSIS — I10 PRIMARY HYPERTENSION: ICD-10-CM

## 2025-04-07 RX ORDER — TAMSULOSIN HYDROCHLORIDE 0.4 MG/1
0.4 CAPSULE ORAL DAILY
Qty: 90 CAPSULE | Refills: 1 | Status: SHIPPED | OUTPATIENT
Start: 2025-04-07

## 2025-04-07 RX ORDER — LEVOTHYROXINE SODIUM 50 UG/1
50 TABLET ORAL DAILY
Qty: 90 TABLET | Refills: 1 | Status: SHIPPED | OUTPATIENT
Start: 2025-04-07

## 2025-04-07 RX ORDER — LISINOPRIL 10 MG/1
10 TABLET ORAL DAILY
Qty: 90 TABLET | Refills: 1 | Status: SHIPPED | OUTPATIENT
Start: 2025-04-07

## 2025-04-07 RX ORDER — SIMVASTATIN 20 MG/1
20 TABLET, FILM COATED ORAL DAILY
Qty: 90 TABLET | Refills: 1 | Status: SHIPPED | OUTPATIENT
Start: 2025-04-07

## 2025-04-07 ASSESSMENT — ACTIVITIES OF DAILY LIVING (ADL)
DRESSING: INDEPENDENT
GROCERY_SHOPPING: INDEPENDENT
MANAGING_FINANCES: INDEPENDENT
BATHING: INDEPENDENT
DOING_HOUSEWORK: INDEPENDENT
TAKING_MEDICATION: INDEPENDENT

## 2025-04-07 ASSESSMENT — ENCOUNTER SYMPTOMS
LOSS OF SENSATION IN FEET: 0
OCCASIONAL FEELINGS OF UNSTEADINESS: 0
DEPRESSION: 0

## 2025-04-07 ASSESSMENT — PATIENT HEALTH QUESTIONNAIRE - PHQ9
1. LITTLE INTEREST OR PLEASURE IN DOING THINGS: NOT AT ALL
SUM OF ALL RESPONSES TO PHQ9 QUESTIONS 1 AND 2: 0
2. FEELING DOWN, DEPRESSED OR HOPELESS: NOT AT ALL
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
## 2025-06-12 ENCOUNTER — OFFICE VISIT (OUTPATIENT)
Dept: PRIMARY CARE | Facility: CLINIC | Age: 85
End: 2025-06-12
Payer: MEDICARE

## 2025-06-12 VITALS
HEIGHT: 69 IN | RESPIRATION RATE: 16 BRPM | SYSTOLIC BLOOD PRESSURE: 138 MMHG | BODY MASS INDEX: 23.73 KG/M2 | HEART RATE: 77 BPM | DIASTOLIC BLOOD PRESSURE: 78 MMHG | WEIGHT: 160.2 LBS | OXYGEN SATURATION: 99 %

## 2025-06-12 DIAGNOSIS — N40.0 BENIGN PROSTATIC HYPERPLASIA WITHOUT LOWER URINARY TRACT SYMPTOMS: ICD-10-CM

## 2025-06-12 DIAGNOSIS — E03.9 ACQUIRED HYPOTHYROIDISM: ICD-10-CM

## 2025-06-12 DIAGNOSIS — N18.31 CHRONIC KIDNEY DISEASE, STAGE 3A (MULTI): ICD-10-CM

## 2025-06-12 DIAGNOSIS — N18.30 ANEMIA ASSOCIATED WITH STAGE 3 CHRONIC RENAL FAILURE: ICD-10-CM

## 2025-06-12 DIAGNOSIS — E11.9 TYPE 2 DIABETES MELLITUS WITHOUT COMPLICATION, WITHOUT LONG-TERM CURRENT USE OF INSULIN: ICD-10-CM

## 2025-06-12 DIAGNOSIS — E78.2 MIXED HYPERLIPIDEMIA: ICD-10-CM

## 2025-06-12 DIAGNOSIS — E55.9 VITAMIN D DEFICIENCY: Primary | ICD-10-CM

## 2025-06-12 DIAGNOSIS — D63.1 ANEMIA ASSOCIATED WITH STAGE 3 CHRONIC RENAL FAILURE: ICD-10-CM

## 2025-06-12 PROBLEM — Z86.39 HISTORY OF ELEVATED LIPIDS: Status: RESOLVED | Noted: 2024-10-07 | Resolved: 2025-06-12

## 2025-06-12 PROBLEM — Z86.79 HISTORY OF HYPERTENSION: Status: RESOLVED | Noted: 2024-10-07 | Resolved: 2025-06-12

## 2025-06-12 PROCEDURE — 99214 OFFICE O/P EST MOD 30 MIN: CPT

## 2025-06-12 PROCEDURE — 1126F AMNT PAIN NOTED NONE PRSNT: CPT

## 2025-06-12 PROCEDURE — 1124F ACP DISCUSS-NO DSCNMKR DOCD: CPT

## 2025-06-12 PROCEDURE — 1036F TOBACCO NON-USER: CPT

## 2025-06-12 PROCEDURE — 3075F SYST BP GE 130 - 139MM HG: CPT

## 2025-06-12 PROCEDURE — 3078F DIAST BP <80 MM HG: CPT

## 2025-06-12 PROCEDURE — 1159F MED LIST DOCD IN RCRD: CPT

## 2025-06-12 RX ORDER — CHOLECALCIFEROL (VITAMIN D3) 25 MCG
25 TABLET ORAL DAILY
COMMUNITY

## 2025-06-12 RX ORDER — OMEGA-3-ACID ETHYL ESTERS 1 G/1
1 CAPSULE, LIQUID FILLED ORAL 2 TIMES DAILY
COMMUNITY

## 2025-06-12 ASSESSMENT — ENCOUNTER SYMPTOMS
DEPRESSION: 0
LOSS OF SENSATION IN FEET: 0
OCCASIONAL FEELINGS OF UNSTEADINESS: 0

## 2025-06-12 ASSESSMENT — PATIENT HEALTH QUESTIONNAIRE - PHQ9
SUM OF ALL RESPONSES TO PHQ9 QUESTIONS 1 AND 2: 0
2. FEELING DOWN, DEPRESSED OR HOPELESS: NOT AT ALL
1. LITTLE INTEREST OR PLEASURE IN DOING THINGS: NOT AT ALL

## 2025-06-12 ASSESSMENT — PAIN SCALES - GENERAL: PAINLEVEL_OUTOF10: 0-NO PAIN

## 2025-06-12 NOTE — PATIENT INSTRUCTIONS
Assessment/Plan   Assessment & Plan  Vitamin D deficiency    Orders:    Vitamin D 25-Hydroxy,Total (for eval of Vitamin D levels); Future    Chronic kidney disease, stage 3a (Multi)    Orders:    Comprehensive Metabolic Panel; Future    Type 2 diabetes mellitus without complication, without long-term current use of insulin    Orders:    Hemoglobin A1C; Future    Albumin-Creatinine Ratio, Urine Random; Future  .dlrd  Anemia associated with stage 3 chronic renal failure    Orders:    CBC and Auto Differential; Future    Mixed hyperlipidemia    Orders:    Lipid Panel; Future    Decrease intake of saturated fats, fast food, sweets.  Increase intake of fresh fruit fresh vegetables and lean meats.  Increase healthy fats seeds, nuts, olive oil instead of butter.  walk 150 minutes/week for heart health.   Aim for 25-30 grams of fiber in your diet daily.  May consider adding Fish Oil supplement 1,200 mg per day or Omega 3 Supplement daily.      Acquired hypothyroidism    Orders:    TSH with reflex to Free T4 if abnormal; Future    Benign prostatic hyperplasia without lower urinary tract symptoms    Orders:    PSA, Total and Free; Future    LAB Order/ BLOOD TESTS   I have ordered lab work for you to get done. This should be fasting. Nothing to eat or drink after midnight besides black tea,  black coffee, or water. If you do not hear from this office within two days of having your labs done, please call for your results.   Please call to schedule an appointment:   [employer] Scheduling phone number is [phone]  You can also schedule an appointment online by logging into   [employer]
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
## 2025-06-14 LAB
25(OH)D3+25(OH)D2 SERPL-MCNC: 50 NG/ML (ref 30–100)
ALBUMIN SERPL-MCNC: 4.4 G/DL (ref 3.6–5.1)
ALBUMIN/CREAT UR: 21 MG/G CREAT
ALP SERPL-CCNC: 60 U/L (ref 35–144)
ALT SERPL-CCNC: 11 U/L (ref 9–46)
ANION GAP SERPL CALCULATED.4IONS-SCNC: 10 MMOL/L (CALC) (ref 7–17)
AST SERPL-CCNC: 14 U/L (ref 10–35)
BASOPHILS # BLD AUTO: 49 CELLS/UL (ref 0–200)
BASOPHILS NFR BLD AUTO: 0.7 %
BILIRUB SERPL-MCNC: 0.9 MG/DL (ref 0.2–1.2)
BUN SERPL-MCNC: 24 MG/DL (ref 7–25)
CALCIUM SERPL-MCNC: 9.6 MG/DL (ref 8.6–10.3)
CHLORIDE SERPL-SCNC: 106 MMOL/L (ref 98–110)
CHOLEST SERPL-MCNC: 147 MG/DL
CHOLEST/HDLC SERPL: 2.8 (CALC)
CO2 SERPL-SCNC: 25 MMOL/L (ref 20–32)
CREAT SERPL-MCNC: 1.22 MG/DL (ref 0.7–1.22)
CREAT UR-MCNC: 19 MG/DL (ref 20–320)
EGFRCR SERPLBLD CKD-EPI 2021: 58 ML/MIN/1.73M2
EOSINOPHIL # BLD AUTO: 98 CELLS/UL (ref 15–500)
EOSINOPHIL NFR BLD AUTO: 1.4 %
ERYTHROCYTE [DISTWIDTH] IN BLOOD BY AUTOMATED COUNT: 13.4 % (ref 11–15)
EST. AVERAGE GLUCOSE BLD GHB EST-MCNC: 146 MG/DL
EST. AVERAGE GLUCOSE BLD GHB EST-SCNC: 8.1 MMOL/L
GLUCOSE SERPL-MCNC: 118 MG/DL (ref 65–99)
HBA1C MFR BLD: 6.7 %
HCT VFR BLD AUTO: 40.5 % (ref 38.5–50)
HDLC SERPL-MCNC: 53 MG/DL
HGB BLD-MCNC: 13 G/DL (ref 13.2–17.1)
LDLC SERPL CALC-MCNC: 80 MG/DL (CALC)
LYMPHOCYTES # BLD AUTO: 1897 CELLS/UL (ref 850–3900)
LYMPHOCYTES NFR BLD AUTO: 27.1 %
MCH RBC QN AUTO: 29.1 PG (ref 27–33)
MCHC RBC AUTO-ENTMCNC: 32.1 G/DL (ref 32–36)
MCV RBC AUTO: 90.6 FL (ref 80–100)
MICROALBUMIN UR-MCNC: 0.4 MG/DL
MONOCYTES # BLD AUTO: 525 CELLS/UL (ref 200–950)
MONOCYTES NFR BLD AUTO: 7.5 %
NEUTROPHILS # BLD AUTO: 4431 CELLS/UL (ref 1500–7800)
NEUTROPHILS NFR BLD AUTO: 63.3 %
NONHDLC SERPL-MCNC: 94 MG/DL (CALC)
PLATELET # BLD AUTO: 166 THOUSAND/UL (ref 140–400)
PMV BLD REES-ECKER: 10.1 FL (ref 7.5–12.5)
POTASSIUM SERPL-SCNC: 4.7 MMOL/L (ref 3.5–5.3)
PROT SERPL-MCNC: 7 G/DL (ref 6.1–8.1)
PSA FREE MFR SERPL: NORMAL %
PSA FREE SERPL-MCNC: NORMAL NG/ML
PSA SERPL-MCNC: NORMAL NG/ML
RBC # BLD AUTO: 4.47 MILLION/UL (ref 4.2–5.8)
SODIUM SERPL-SCNC: 141 MMOL/L (ref 135–146)
TRIGL SERPL-MCNC: 64 MG/DL
TSH SERPL-ACNC: 4.37 MIU/L (ref 0.4–4.5)
WBC # BLD AUTO: 7 THOUSAND/UL (ref 3.8–10.8)

## 2025-06-16 LAB
25(OH)D3+25(OH)D2 SERPL-MCNC: 50 NG/ML (ref 30–100)
ALBUMIN SERPL-MCNC: 4.4 G/DL (ref 3.6–5.1)
ALBUMIN/CREAT UR: 21 MG/G CREAT
ALP SERPL-CCNC: 60 U/L (ref 35–144)
ALT SERPL-CCNC: 11 U/L (ref 9–46)
ANION GAP SERPL CALCULATED.4IONS-SCNC: 10 MMOL/L (CALC) (ref 7–17)
AST SERPL-CCNC: 14 U/L (ref 10–35)
BASOPHILS # BLD AUTO: 49 CELLS/UL (ref 0–200)
BASOPHILS NFR BLD AUTO: 0.7 %
BILIRUB SERPL-MCNC: 0.9 MG/DL (ref 0.2–1.2)
BUN SERPL-MCNC: 24 MG/DL (ref 7–25)
CALCIUM SERPL-MCNC: 9.6 MG/DL (ref 8.6–10.3)
CHLORIDE SERPL-SCNC: 106 MMOL/L (ref 98–110)
CHOLEST SERPL-MCNC: 147 MG/DL
CHOLEST/HDLC SERPL: 2.8 (CALC)
CO2 SERPL-SCNC: 25 MMOL/L (ref 20–32)
CREAT SERPL-MCNC: 1.22 MG/DL (ref 0.7–1.22)
CREAT UR-MCNC: 19 MG/DL (ref 20–320)
EGFRCR SERPLBLD CKD-EPI 2021: 58 ML/MIN/1.73M2
EOSINOPHIL # BLD AUTO: 98 CELLS/UL (ref 15–500)
EOSINOPHIL NFR BLD AUTO: 1.4 %
ERYTHROCYTE [DISTWIDTH] IN BLOOD BY AUTOMATED COUNT: 13.4 % (ref 11–15)
EST. AVERAGE GLUCOSE BLD GHB EST-MCNC: 146 MG/DL
EST. AVERAGE GLUCOSE BLD GHB EST-SCNC: 8.1 MMOL/L
GLUCOSE SERPL-MCNC: 118 MG/DL (ref 65–99)
HBA1C MFR BLD: 6.7 %
HCT VFR BLD AUTO: 40.5 % (ref 38.5–50)
HDLC SERPL-MCNC: 53 MG/DL
HGB BLD-MCNC: 13 G/DL (ref 13.2–17.1)
LDLC SERPL CALC-MCNC: 80 MG/DL (CALC)
LYMPHOCYTES # BLD AUTO: 1897 CELLS/UL (ref 850–3900)
LYMPHOCYTES NFR BLD AUTO: 27.1 %
MCH RBC QN AUTO: 29.1 PG (ref 27–33)
MCHC RBC AUTO-ENTMCNC: 32.1 G/DL (ref 32–36)
MCV RBC AUTO: 90.6 FL (ref 80–100)
MICROALBUMIN UR-MCNC: 0.4 MG/DL
MONOCYTES # BLD AUTO: 525 CELLS/UL (ref 200–950)
MONOCYTES NFR BLD AUTO: 7.5 %
NEUTROPHILS # BLD AUTO: 4431 CELLS/UL (ref 1500–7800)
NEUTROPHILS NFR BLD AUTO: 63.3 %
NONHDLC SERPL-MCNC: 94 MG/DL (CALC)
PLATELET # BLD AUTO: 166 THOUSAND/UL (ref 140–400)
PMV BLD REES-ECKER: 10.1 FL (ref 7.5–12.5)
POTASSIUM SERPL-SCNC: 4.7 MMOL/L (ref 3.5–5.3)
PROT SERPL-MCNC: 7 G/DL (ref 6.1–8.1)
PSA FREE MFR SERPL: 33 % (CALC)
PSA FREE SERPL-MCNC: 2 NG/ML
PSA SERPL-MCNC: 6.1 NG/ML
RBC # BLD AUTO: 4.47 MILLION/UL (ref 4.2–5.8)
SODIUM SERPL-SCNC: 141 MMOL/L (ref 135–146)
TRIGL SERPL-MCNC: 64 MG/DL
TSH SERPL-ACNC: 4.37 MIU/L (ref 0.4–4.5)
WBC # BLD AUTO: 7 THOUSAND/UL (ref 3.8–10.8)

## 2025-06-17 DIAGNOSIS — R97.20 ELEVATED PSA MEASUREMENT: Primary | ICD-10-CM
